# Patient Record
Sex: FEMALE | URBAN - METROPOLITAN AREA
[De-identification: names, ages, dates, MRNs, and addresses within clinical notes are randomized per-mention and may not be internally consistent; named-entity substitution may affect disease eponyms.]

---

## 2022-08-18 ENCOUNTER — OFFICE VISIT (OUTPATIENT)
Dept: ORTHOPEDIC SURGERY | Age: 77
End: 2022-08-18
Payer: COMMERCIAL

## 2022-08-18 DIAGNOSIS — M75.121 COMPLETE TEAR OF RIGHT ROTATOR CUFF, UNSPECIFIED WHETHER TRAUMATIC: ICD-10-CM

## 2022-08-18 DIAGNOSIS — M25.511 RIGHT SHOULDER PAIN, UNSPECIFIED CHRONICITY: Primary | ICD-10-CM

## 2022-08-18 PROCEDURE — 1123F ACP DISCUSS/DSCN MKR DOCD: CPT | Performed by: ORTHOPAEDIC SURGERY

## 2022-08-18 PROCEDURE — 20610 DRAIN/INJ JOINT/BURSA W/O US: CPT | Performed by: ORTHOPAEDIC SURGERY

## 2022-08-18 PROCEDURE — 99203 OFFICE O/P NEW LOW 30 MIN: CPT | Performed by: ORTHOPAEDIC SURGERY

## 2022-08-18 RX ORDER — HYDROXYZINE HYDROCHLORIDE 25 MG/1
25 TABLET, FILM COATED ORAL NIGHTLY
COMMUNITY
Start: 2022-08-05

## 2022-08-18 RX ORDER — AMLODIPINE BESYLATE 2.5 MG/1
TABLET ORAL
COMMUNITY
Start: 2022-08-10

## 2022-08-18 RX ORDER — FLECAINIDE ACETATE 50 MG/1
50 TABLET ORAL EVERY 12 HOURS SCHEDULED
COMMUNITY
Start: 2022-08-05

## 2022-08-18 RX ORDER — FLUTICASONE FUROATE, UMECLIDINIUM BROMIDE AND VILANTEROL TRIFENATATE 200; 62.5; 25 UG/1; UG/1; UG/1
POWDER RESPIRATORY (INHALATION)
COMMUNITY
Start: 2022-08-05

## 2022-08-18 RX ORDER — METOPROLOL SUCCINATE 50 MG/1
50 TABLET, EXTENDED RELEASE ORAL 2 TIMES DAILY
COMMUNITY
Start: 2022-08-05

## 2022-08-18 RX ORDER — TRIAMCINOLONE ACETONIDE 40 MG/ML
40 INJECTION, SUSPENSION INTRA-ARTICULAR; INTRAMUSCULAR ONCE
Status: COMPLETED | OUTPATIENT
Start: 2022-08-18 | End: 2022-08-18

## 2022-08-18 RX ORDER — LIDOCAINE HYDROCHLORIDE 10 MG/ML
1 INJECTION, SOLUTION INFILTRATION; PERINEURAL ONCE
Status: COMPLETED | OUTPATIENT
Start: 2022-08-18 | End: 2022-08-18

## 2022-08-18 RX ORDER — LEVOTHYROXINE SODIUM 0.07 MG/1
75 TABLET ORAL DAILY
COMMUNITY
Start: 2022-08-05

## 2022-08-18 RX ORDER — OXYCODONE AND ACETAMINOPHEN 7.5; 325 MG/1; MG/1
1 TABLET ORAL EVERY 6 HOURS PRN
COMMUNITY

## 2022-08-18 RX ORDER — DOXAZOSIN 2 MG/1
2 TABLET ORAL DAILY
COMMUNITY
Start: 2022-08-05

## 2022-08-18 RX ORDER — PRIMIDONE 50 MG/1
50 TABLET ORAL EVERY 12 HOURS SCHEDULED
COMMUNITY
Start: 2022-08-05

## 2022-08-18 RX ORDER — FLUTICASONE FUROATE, UMECLIDINIUM BROMIDE AND VILANTEROL TRIFENATATE 200; 62.5; 25 UG/1; UG/1; UG/1
1 POWDER RESPIRATORY (INHALATION) DAILY
COMMUNITY
Start: 2022-08-05

## 2022-08-18 RX ORDER — PANTOPRAZOLE SODIUM 40 MG/1
TABLET, DELAYED RELEASE ORAL
COMMUNITY
Start: 2022-08-05

## 2022-08-18 RX ORDER — FLECAINIDE ACETATE 50 MG/1
TABLET ORAL
COMMUNITY
Start: 2022-08-05

## 2022-08-18 RX ORDER — PREGABALIN 25 MG/1
25 CAPSULE ORAL 3 TIMES DAILY
COMMUNITY
Start: 2022-04-17

## 2022-08-18 RX ORDER — CALCIUM POLYCARBOPHIL 625 MG
625 TABLET ORAL DAILY
COMMUNITY

## 2022-08-18 RX ORDER — ATORVASTATIN CALCIUM 40 MG/1
40 TABLET, FILM COATED ORAL NIGHTLY
COMMUNITY
Start: 2022-08-05

## 2022-08-18 RX ORDER — MIRTAZAPINE 15 MG/1
15 TABLET, FILM COATED ORAL NIGHTLY
COMMUNITY
Start: 2022-08-05

## 2022-08-18 RX ORDER — DOXAZOSIN 2 MG/1
TABLET ORAL
COMMUNITY
Start: 2022-08-05

## 2022-08-18 RX ORDER — ATORVASTATIN CALCIUM 40 MG/1
TABLET, FILM COATED ORAL
COMMUNITY
Start: 2022-08-05

## 2022-08-18 RX ORDER — FERROUS SULFATE 325(65) MG
325 TABLET ORAL 2 TIMES DAILY WITH MEALS
COMMUNITY
Start: 2022-08-05

## 2022-08-18 RX ORDER — INCONTINENCE PAD,LINER,DISP
EACH MISCELLANEOUS
COMMUNITY
Start: 2022-07-05

## 2022-08-18 RX ADMIN — LIDOCAINE HYDROCHLORIDE 1 ML: 10 INJECTION, SOLUTION INFILTRATION; PERINEURAL at 13:49

## 2022-08-18 RX ADMIN — TRIAMCINOLONE ACETONIDE 40 MG: 40 INJECTION, SUSPENSION INTRA-ARTICULAR; INTRAMUSCULAR at 13:49

## 2022-08-18 NOTE — PROGRESS NOTES
Chief Complaint    New Patient (Right Shoulder)      History of Present Illness:  Kevin Steiner is a pleasant,  68 y.o. female here today on referral from Dr. Valerio Suresh for consultation and evaluation of right shoulder pain. She has had right shoulder pain for years. Acutely worsened over the past couple months. In April of this year she fell and sustained 3 rib fractures on the right side since then her right shoulder has been hurting her progressively worse. Over the past 6 weeks her right shoulder has become very debilitating and interfering with her activity of daily function. She received a cortisone injection prior to her fall which helped with her pain a little bit and she received another cortisone injection shortly after the fall with some short-term relief without sustained benefit. She has pain at night when she is sleeping. Her range of motion is severely limited. Her pain is exacerbated by moving her shoulder. Medical History:  COPD on 2 L of oxygen at baseline  Atrial fibrillation  Walker dependent  3 7.5 mg percocet daily for chronic pain    No notes on file    Patient's medications, allergies, past medical, surgical, social and family histories were reviewed and updated as appropriate. No past medical history on file.    Social History     Socioeconomic History    Marital status: Not on file     Spouse name: Not on file    Number of children: Not on file    Years of education: Not on file    Highest education level: Not on file   Occupational History    Not on file   Tobacco Use    Smoking status: Not on file    Smokeless tobacco: Not on file   Substance and Sexual Activity    Alcohol use: Not on file    Drug use: Not on file    Sexual activity: Not on file   Other Topics Concern    Not on file   Social History Narrative    Not on file     Social Determinants of Health     Financial Resource Strain: Not on file   Food Insecurity: Not on file   Transportation Needs: Not on file   Physical Activity: Not on file   Stress: Not on file   Social Connections: Not on file   Intimate Partner Violence: Not on file   Housing Stability: Not on file       Allergies   Allergen Reactions    Ibuprofen Hives    Meperidine Hives    Lidocaine      Other reaction(s): Unknown    Meperidine Hcl      Other reaction(s): Unknown    Olanzapine      Other reaction(s): \"shakiness\"    Furosemide Swelling     Other reaction(s): Unknown  Pt states that she cannot take generic form (furosemide)  Face and throat swelling       No current outpatient medications on file prior to visit. No current facility-administered medications on file prior to visit. Review of Systems  A 14 point review of systems was completed by the patient on 8/18/2022 and is available in the media section of the scanned medical record and was reviewed on 8/18/2022. The review is negative with the exception of those things mentioned in the HPI and Past Medical History    Vital Signs:  Vitals:       General Exam:   Constitutional: Patient is adequately groomed with no evidence of malnutrition      R Shoulder Examination:    Inspection:  No skin lesions, no obvious deformity, no swelling. Palpation:  Tenderness all over    Active Range of Motion: Forward Elevation 85, Abduction 85, External Rotation 40, Internal Rotation L5    Passive Range of Motion: same with significant pain and crepitus    Strength:  External Rotation 4/5, Internal Rotation 4/5, Champagne Toast 4/5, Supraspinatus 4/5    Special Tests:  + Zhane's, + Hawkin's, No Art deformity. Neurovascular: Palpable radial pulse, normal sensation in the median, ulnar, radial nerve distributions  Self assessment questionnaires were completed today.       Radiology:     Plain radiographs of the right shoulder, comprising 3 views: AP, Scapular Y and Axillary lateral were  obtained and reviewed in the office:    Impression: Combination of glenohumeral arthritis and a high riding of the humerus likely a combination of glenohumeral arthritis and chronic rotator cuff deficiency. Assessment :  Elton Crowell is a pleasant 68 y.o. female with pain related to R glenohumeral arthritis and chronic rotator cuff deficiency. She has had chronic right shoulder pain but was able to cope with the symptoms and function with activity of daily living despite being walker dependent. However since her recent fall her right shoulder function acutely deteriorated with significant increase in pain and dysfunction. Impression:  Encounter Diagnoses   Name Primary? Right shoulder pain, unspecified chronicity Yes    Complete tear of right rotator cuff, unspecified whether traumatic        Office Procedures:  Orders Placed This Encounter   Procedures    XR SHOULDER RIGHT 1 VW     Standing Status:   Future     Number of Occurrences:   1     Standing Expiration Date:   8/17/2023     Order Specific Question:   Reason for exam:     Answer:   right shoulder pain    20610 - CO DRAIN/INJECT LARGE JOINT/BURSA       Treatment Plan:  Pertinent imaging and exam findings were reviewed with Elton Crowell. The etiology, natural history, and treatment options for the disorder were discussed. The roles of activity modifications, medications, cryotherapy and heat, injections, physical therapy, and surgical interventions were all described to the patient and questions were answered. Procedure: The indications and risks of steroid injection as well as treatment alternatives were discussed with the patient who consented to the procedure. Under sterile conditions and after informed consent was obtained, the patient was given an injection into the right shoulder glenohumeral joint and AC joint. 2mL 40 mg of Kenalog  and 8 mL of 1% lidocaine were placed in the shoulder after it was prepped with chlorhexidine. This resulted in good relief of symptoms. There were no complications.  The patient was advised to ice the shoulder this evening and to avoid vigorous activities for the next 2 days. They were advised to call us if there was any erythema, enduration, swelling or increasing pain. Immediately after the injection with lidocaine, her active FF and abduction improved to over 100 degrees. Elma Soria will follow up in 3 weeks and/or as needed. She is in agreement with this plan and all questions were answered to the patient's satisfaction. She was encouraged to call with any questions. 1:07 PM  8/18/2022    Theresa Houser MD  Sports Medicine Fellow  [address] [place] [address]    The encounter with Elma Soria was supervised by Dr. Dorys Ballesteros who personally examined the patient and reviewed the plan. This dictation was performed with a verbal recognition program (DRAGON) and it was checked for errors. It is possible that there are still dictated errors within this office note. If so, please bring any errors to my attention for an addendum. All efforts were made to ensure that this office note is accurate.   ______________________  I was physically present and personally supervised the Orthopaedic Sports Medicine Fellow in the evaluation and development of a treatment plan for this patient. I personally interviewed the patient and performed a physical examination. In addition, I discussed the patient's condition and treatment options with them. I have also reviewed and agree with the past medical, family and social history unless otherwise noted. All of the patient's questions were answered. Valeria Ballesteros MD, PhD  8/18/2022

## 2022-09-06 ENCOUNTER — OFFICE VISIT (OUTPATIENT)
Dept: ORTHOPEDIC SURGERY | Age: 77
End: 2022-09-06
Payer: COMMERCIAL

## 2022-09-06 DIAGNOSIS — M12.811 ROTATOR CUFF ARTHROPATHY OF RIGHT SHOULDER: ICD-10-CM

## 2022-09-06 DIAGNOSIS — M25.511 RIGHT SHOULDER PAIN, UNSPECIFIED CHRONICITY: Primary | ICD-10-CM

## 2022-09-06 DIAGNOSIS — M75.121 COMPLETE TEAR OF RIGHT ROTATOR CUFF, UNSPECIFIED WHETHER TRAUMATIC: ICD-10-CM

## 2022-09-06 PROCEDURE — 1123F ACP DISCUSS/DSCN MKR DOCD: CPT | Performed by: ORTHOPAEDIC SURGERY

## 2022-09-06 PROCEDURE — 99214 OFFICE O/P EST MOD 30 MIN: CPT | Performed by: ORTHOPAEDIC SURGERY

## 2022-09-06 NOTE — PROGRESS NOTES
[facility]  History and Physical  Shoulder Pain    Date:  2022    Name:  Gerardo Andrade  Address:  [address] [place] [address]    :  1945      Age:   68 y.o.    SSN:  [id number]      Medical Record Number:  [id number]    Reason for Visit:    Follow-up (Right Shoulder)      HPI:   Gerardo Andrade is a 68 y.o. female who presents to our office today for follow up of the right shoulder pain. The patient has well known osteoarthritis of the right glenohumeral joint coupled with a chronic rotator cuff tear. She has responded well to conservative management including corticosteroid injections. The most recent injection was given on 2022 and that gave the patient a tremendous amount of relief. The patient feels that the injection is starting to wear off and her pain levels are gradually increasing. Patient was able to gain more movement with the right shoulder since the injection as well. No new injuries or trauma since the last visit. Gerardo Andrade has a history of COPD, and is on 2L NC. Per her daughter-in-law she also has hemidiaphragmatic paralysis and atrial fibrillation. He ambulates with a special walker. No notes on file    Review of Systems:  A 14 point review of systems available in the scanned medical record as documented by the patient and reviewed on 2022. The review is negative with the exception of those things mentioned in the History of Present Illness and Past Medical History. Past Medical History:  Patient's medications, allergies, past medical, surgical, social and family histories were reviewed and updated as appropriate. Allergies:   Allergies   Allergen Reactions    Ibuprofen Hives    Meperidine Hives    Lidocaine      Other reaction(s): Unknown    Meperidine Hcl      Other reaction(s): Unknown    Olanzapine      Other reaction(s): \"shakiness\"    Furosemide Swelling     Other reaction(s): Unknown  Pt states that she cannot take generic form (furosemide)  Face and throat swelling         Physical Exam:  Vitals:     General: Ron Reddy is a healthy and well appearing 68 y.o. female who is sitting comfortably in our office in acute distress. Skin:  There are no skin lesions, cellulitis, or extreme edema. The patient has warm and well-perfused Bilateral upper extremities with brisk capillary refill. Eyes: Extra-ocular muscles intact  Mouth: Oral mucosa moist. No perioral lesions  Pulm: Respirations unlabored and regular. Neuro: Alert and oriented x3    right Shoulder Exam:  Inspection:  No gross deformities, no signs of infection. Palpation:  There is crepitus. Tenderness to palpation over the rotator cuff footprint and posterior joint line. Non-tender to palpation over the [facility] joint. Active Range of Motion: Forward elevation of 90, abduction of 80, external rotation with elbow at the side 35, internal rotation to the back is L3 versus T11 on the left    Passive Range of Motion: Passively forward elevation can be further increased to 100. Strength: External rotation with resistance with elbow at the side 4/5, internal rotation with resistance with elbow at the side 4/5, Champagne toast testing 4/5, Jobes test 4/5    Special Tests: No Art muscle deformity. Neurovascular: Sensation to light touch is intact, no motor deficits, palpable radial pulses 2+    Additional Examinations:    Examination of the contralateral extremity does not show any tenderness, deformity or injury. Range of motion is unremarkable. There is no gross instability. There are no rashes, ulcerations or lesions. Strength and tone are normal.      Radiographic:  X ray not obtained. Assessment:  Ron Reddy is a 68 y.o. female with a history of COPD, A. fib and paralysis of Fady diaphragm currently with severe end-stage rotator cuff arthropathy. Impression:  Encounter Diagnoses   Name Primary?     Right shoulder pain, unspecified chronicity Yes    Complete tear of right rotator cuff, unspecified whether traumatic     Rotator cuff arthropathy of right shoulder        Office Procedures:  No orders of the defined types were placed in this encounter. Plan: We had a candid discussion with Tavo Chapa regarding the next steps moving forward. She really does not have a lot of options for managing the symptoms related to her right shoulder rotator cuff arthropathy. The patient may consider receiving additional corticosteroid injections however this is limited to one every 3 or 4 months. Over time, the effects of these injections will continue to diminish. She is only getting a few weeks of relief from her most recent injection, so this does not appear to be a long term solution. She could also try pain management but opioids and other analgesics may not be the best for her as they may worsen respiratory depression and her overall balance. We do feel that definitive treatment is a reverse total shoulder arthroplasty. She would need medical clearance for this procedure to be done. She is encouraged to talk to her family doctor, pulmonologist and cardiologist regarding this. When she has met with them we will see her back to discuss surgery in full detail. Of note, we will arrange for an anesthesia consult prior to potential surgery. She may not be a candidate for an interscalene block given her hemidiaphragm dysfunction. Tavo Chapa will follow up in 4 weeks and/or as needed. They were in agreement with this plan and all questions were answered to the patient's satisfaction. They were encouraged to call with any questions. Greater than 30 minutes were expended on all aspects of today's visit. 9/6/2022  11:48 AM      Collin Brennan PA-C  Orthopaedic Sports Medicine Physician Assistant    During this examination, ICollin PA-C, functioned as a scribe for Dr. Thiago Guillen.      This dictation was performed with a verbal recognition program (DRAGON) and it was checked for errors. It is possible that there are still dictated errors within this office note. If so, please bring any errors to my attention for an addendum. All efforts were made to ensure that this office note is accurate.  ____________  I, Dr. Ulysses Raymond, personally performed the services described in this documentation as described by Cayedn Coleman PA-C in my presence, and it is both accurate and complete. Valeria Lord MD, PhD  9/6/2022

## 2022-11-16 ENCOUNTER — TELEPHONE (OUTPATIENT)
Dept: ORTHOPEDIC SURGERY | Age: 77
End: 2022-11-16

## 2022-11-16 NOTE — TELEPHONE ENCOUNTER
Surgery and/or Procedure Scheduling     Contact Name: Opal Cool Request: [facility] RIGHT  Patient Contact Number: [phone]

## 2022-11-18 NOTE — TELEPHONE ENCOUNTER
ON SCHEDULE 12/13, HER LAST CLEARANCE APPT WILL BE 12/5.  SHE WILL NEED TO STAY AND BE SET FOR FACILITY

## 2022-11-25 ENCOUNTER — TELEPHONE (OUTPATIENT)
Dept: ORTHOPEDIC SURGERY | Age: 77
End: 2022-11-25

## 2022-11-29 NOTE — TELEPHONE ENCOUNTER
Darin Plan [id number]    Date: 12/13/22 thru 03/12/23  Type of SX:  Outpatient  Location: [facility]  CPT: 82384   DX Code: I65.760  SX area: Rt shoulder  Insurance: [employer]

## 2022-12-02 ENCOUNTER — TELEPHONE (OUTPATIENT)
Dept: ORTHOPEDIC SURGERY | Age: 77
End: 2022-12-02

## 2022-12-02 NOTE — TELEPHONE ENCOUNTER
Orthopedic Nurse Navigator Summary    COVID:  Vaccinated and booster    Patient Name:  Oscar Spears  Anticipated Date of Surgery:  12/13/22  Attended Pre-op Education Class:  Video sent to her daughter in laws email- they will watch together  PCP: Jayson Parmar DO  Date of PCP visit for H&P: 12/05/22  Is patient in a Pain Management program: Yes- she sees Dr. Thornton Doing- taking Oxycodone 7.5 mg TID, and Lyrica 25mg TID  Review of Medical history reveals history of: Chronic respiratory failure- thought to be due to poor CPAP compliance, COPD, JENNIE, PAF, CHF, HTN, Hyperlipidemia, Secondary parkinsonism, Hypothyroid, GERD, Essential tremor, Gout, Fibromyalgia, History of Rt breast Cancer with mastectomy and sentinel lymph node biopsy    Critical Lab Values  - Hemoglobin (g/dL):  Date: 12/05/22 Value 13.3  - Hematocrit(%): Date: 12/05/22  Value 43.2  - HgbA1C:  Date: 12/05/22 Value 5.1  - Albumin:  Date: 12/05/22  Value 4.2  - BUN:  Date: 12/05/22  Value 20  - Creatinine:  Date: 12/05/22  Value 0.84    MRSA swab 12/05/22-negative    Coronary Artery Disease/HTN/CHF history: Yes  Cardiologist: Catalino Julian MD  Cardiac clearance necessary:  Yes  Date of cardiac clearance appt: 11/28/22  Final Cardiac recommendations: On any anticoagulation:    Diabetes History:  No  Most recent HgbA1C:  Pulmonary:  COPD/Emphysema/Use of home oxygen: COPD- uses 2.5L of Oxygen at all times  Alcohol use: No    BMI greater than 40 at time of scheduling: Additional medical concerns: Additional recommendations for above concerns:  Attended Pre-Hab program:    Anticipated Discharge Disposition:  Rehab unit.   Patient states Dr. Lo Puri has plans to send her to the rehab unit with her multiple health issues and she lives alone and does not have full time help from family members  Who will be with patient at home following discharge:  She lives alone  Equipment patient already has:  No sling- she does have an appointment on 12/06/22 with  Juanirrie Sprain on first or second floor:  first  Bathroom on first or second floor:  first  Weight bearing status:  neb rt arm  Pre-op ambulatory status:   Number of entry steps:  Her apartment building has a ramp for entrance   Caregiver assistance:  part time- her daughter in law will be available to drive her to doctor appointments and outpatient therapy    Dionisio Us RN  Date: 12/02/22

## 2022-12-06 ENCOUNTER — OFFICE VISIT (OUTPATIENT)
Dept: ORTHOPEDIC SURGERY | Age: 77
End: 2022-12-06

## 2022-12-06 VITALS — HEIGHT: 65 IN | BODY MASS INDEX: 30.32 KG/M2 | WEIGHT: 182 LBS

## 2022-12-06 DIAGNOSIS — M12.811 ROTATOR CUFF ARTHROPATHY OF RIGHT SHOULDER: ICD-10-CM

## 2022-12-06 DIAGNOSIS — M75.121 COMPLETE TEAR OF RIGHT ROTATOR CUFF, UNSPECIFIED WHETHER TRAUMATIC: ICD-10-CM

## 2022-12-06 DIAGNOSIS — M25.511 RIGHT SHOULDER PAIN, UNSPECIFIED CHRONICITY: Primary | ICD-10-CM

## 2022-12-06 PROBLEM — I50.43 ACUTE ON CHRONIC COMBINED SYSTOLIC AND DIASTOLIC CONGESTIVE HEART FAILURE (HCC): Status: ACTIVE | Noted: 2017-04-11

## 2022-12-06 PROBLEM — M81.0 AGE-RELATED OSTEOPOROSIS WITHOUT CURRENT PATHOLOGICAL FRACTURE: Status: ACTIVE | Noted: 2022-07-11

## 2022-12-06 PROBLEM — J96.11 CHRONIC RESPIRATORY FAILURE WITH HYPOXIA AND HYPERCAPNIA (HCC): Status: ACTIVE | Noted: 2021-11-30

## 2022-12-06 PROBLEM — J96.12 CHRONIC RESPIRATORY FAILURE WITH HYPOXIA AND HYPERCAPNIA (HCC): Status: ACTIVE | Noted: 2021-11-30

## 2022-12-06 PROBLEM — J44.9 COPD (CHRONIC OBSTRUCTIVE PULMONARY DISEASE) (HCC): Status: ACTIVE | Noted: 2021-04-09

## 2022-12-06 NOTE — PROGRESS NOTES
[facility]  History and Physical  Shoulder Pain    Date:  2022    Name:  Claire Morris  Address:  [address] [place] [address]    :  1945      Age:   68 y.o.    SSN:  [id number]      Medical Record Number:  [id number]    Reason for Visit:    Shoulder Pain (PRE OP RIGHT SHOULDER)      HPI:   Claire Morris is a 68 y.o. female who presents to our office today for follow up of the right shoulder pain. This patient has severe end-stage glenohumeral osteoarthritis. Patient has extremely poor function as well as severe discomfort that has not responded well to conservative treatment. Patient did have a corticosteroid injection in the past which was not effective for more than 2 weeks at a time. The patient reports she was able to meet with her cardiologist, pulmonologist and her medical doctor and was able to finally be cleared for surgical intervention. We had discussed doing a reverse shoulder replacement in the past.  Patient presents today with her daughter to discuss this further. She continues to use a walker to ambulate as she is a fall risk. She has pain with movement overhead or behind her back. She reports it does wake her up at nighttime several times. Pain Assessment  Location of Pain: Shoulder  Location Modifiers: Right  Severity of Pain: 7  Quality of Pain: Throbbing, Sharp, Dull, Aching  Duration of Pain: Persistent  Frequency of Pain: Constant  Aggravating Factors: Other (Comment), Straightening, Stretching  Limiting Behavior: Yes  Relieving Factors: Rest  Work-Related Injury: No  Are there other pain locations you wish to document?: No    No notes on file    Review of Systems:  A 14 point review of systems available in the scanned medical record as documented by the patient and reviewed on 2022.   The review is negative with the exception of those things mentioned in the History of Present Illness and Past Medical History. Past Medical History:  Patient's medications, allergies, past medical, surgical, social and family histories were reviewed and updated as appropriate. Allergies: Allergies   Allergen Reactions    Ibuprofen Hives    Meperidine Hives    Meperidine Hcl      Other reaction(s): Unknown    Olanzapine      Other reaction(s): \"shakiness\"    Furosemide Swelling     Other reaction(s): Unknown  Pt states that she cannot take generic form (furosemide)  Face and throat swelling      Lidocaine Rash     Other reaction(s): Unknown  Other reaction(s): Unknown       Physical Exam:  There were no vitals filed for this visit. General: Chris Martinez is a healthy and well appearing 68 y.o. female who is sitting comfortably in our office in acute distress. Skin:  There are no skin lesions, cellulitis, or extreme edema. The patient has warm and well-perfused Bilateral upper extremities with brisk capillary refill. Eyes: Extra-ocular muscles intact  Mouth: Oral mucosa moist. No perioral lesions  Pulm: Respirations unlabored and regular. Neuro: Alert and oriented x3    Right Shoulder Exam:  Inspection:  No gross deformities, no signs of infection. Palpation:  There is crepitus. Tenderness to palpation over the rotator cuff footprint, posterior joint line and bicipital groove. Non-tender to palpation over the kac joint. Active Range of Motion: Forward elevation of 80 with scapulothoracic compensation, abduction of 70, external rotation with elbow at the side 20, internal rotation to the back is L4    Passive Range of Motion: Passively forward elevation can be further increased to 110 pain. Strength: External rotation with resistance with elbow at the side -4/5, internal rotation with resistance with elbow at the side 4/5, Champagne toast testing -4/5, Jobes test -4/5    Special Tests: No external rotation lag, positive Zhane, no Art muscle deformity.     Neurovascular: Sensation to light touch is intact, no motor deficits, palpable radial pulses 2+    Additional Examinations:    Examination of the contralateral extremity does not show any tenderness, deformity or injury. Range of motion is unremarkable. There is no gross instability. There are no rashes, ulcerations or lesions. Strength and tone are normal.      Radiographic:  X-rays from August 18 th, 2022 showed severe glenohumeral degenerative changes. Assessment:  Manuela Castellanos is a 68 y.o. female with right shoulder pain and limited function related to severe and advancing glenohumeral osteoarthritis a chronic rotator cuff tear. Impression:  Encounter Diagnoses   Name Primary? Right shoulder pain, unspecified chronicity Yes    Complete tear of right rotator cuff, unspecified whether traumatic     Rotator cuff arthropathy of right shoulder        Office Procedures:  Orders Placed This Encounter   Procedures    MRI SHOULDER RIGHT WO CONTRAST     Standing Status:   Future     Standing Expiration Date:   12/6/2023     Order Specific Question:   Reason for exam:     Answer:   Right shoulder     Order Specific Question:   What is the sedation requirement? Answer:   None    CT SHOULDER RIGHT WO CONTRAST     Standing Status:   Future     Standing Expiration Date:   12/6/2023     Order Specific Question:   Reason for exam:     Answer:   DJO matchpoint     Order Specific Question:   Reason for exam:     Answer:   Darya Clark    DJO ultrasling IV Shoulder Sling     Patient was prescribed a DJO Ultrasling IV Shoulder Brace. The right shoulder will require stabilization / immobilization from this orthosis. The orthosis will assist in protecting the affected area, provide functional support and facilitate healing. The device was ordered and fit on 12/6/22. The patient was educated and fit by a healthcare professional with expert knowledge and specialization in brace application while under the direct supervision of the treating physician.   Verbal and written instructions for the use of and application of this item were provided. They were instructed to contact the office immediately should the brace result in increased pain, decreased sensation, increased swelling or worsening of the condition. Plan: We had a lengthy discussion with the patient today. Patient has failed conservative treatment and we feel that her next best option is a reverse total shoulder arthroplasty for the right shoulder. This would help with restoring both function and reducing pain levels. We will require some testing prior to her surgery for planning purposes. We do recommend getting a CT scan of her right shoulder. This will need to be completed prior to her surgery which we are hoping to plan for next week. Matt Ying will follow up postoperative and/or as needed. They were in agreement with this plan and all questions were answered to the patient's satisfaction. They were encouraged to call with any questions. 12/6/2022  2:50 PM      Thuan Guerra PA-C  Orthopaedic Sports Medicine Physician Assistant      This dictation was performed with a verbal recognition program [facility]) and it was checked for errors. It is possible that there are still dictated errors within this office note. If so, please bring any errors to my attention for an addendum. All efforts were made to ensure that this office note is accurate.

## 2022-12-12 NOTE — PROGRESS NOTES
Place patient label inside box (if no patient label, complete below)  Name:  :  MR#:   Tamar Marcano / [address]  I (we), Nate Gaines (Patient Name) authorize Eliana Venegas MD (Provider / Shelby Thayer) and/or such assistants as may be selected by him/her, to perform the following operation/procedure(s): RIGHT REVERSE TOTAL SHOULDER REPLACEMENT        Note: If unable to obtain consent prior to an emergent procedure, document the emergent reason in the medical record. This procedure has been explained to my (our) satisfaction and included in the explanation was: The intended benefit, nature, and extent of the procedure to be performed; The significant risks involved and the probability of success; Alternative procedures and methods of treatment; The dangers and probable consequences of such alternatives (including no procedure or treatment); The expected consequences of the procedure on my future health; Whether other qualified individuals would be performing important surgical tasks and/or whether  would be present to advise or support the procedure. I (we) understand that there are other risks of infection and other serious complications in the pre-operative/procedural and postoperative/procedural stages of my (our) care. I (we) have asked all of the questions which I (we) thought were important in deciding whether or not to undergo treatment or diagnosis. These questions have been answered to my (our) satisfaction. I (we) understand that no assurance can be given that the procedure will be a success, and no guarantee or warranty of success has been given to me (us). It has been explained to me (us) that during the course of the operation/procedure, unforeseen conditions may be revealed that necessitate extension of the original procedure(s) or different procedure(s) than those set forth in Paragraph 1.  I (we) authorize and request that the ______________________________________________________________________________________________    If a phone consent is obtained, consent will be documented by using two health care professionals, each affirming that the consenting party has no questions and gives consent for the procedure discussed with the physician/provider.   _____________________          ____________________       _____/_____am/pm   2nd witness to phone consent        Printed name           Date / Time    Informed Consent:  I have provided the explanation described above in section 1 to the patient and/or legal representative.  I have provided the patient and/or legal representative with an opportunity to ask any questions about the proposed operation/procedure.   ___________________________          ____________________         ____/____am/pm  Provider / Proceduralist                            Printed name            Date / Time  Revised 8/2/2021                                                                      Page 2 of 2

## 2022-12-12 NOTE — PROGRESS NOTES

## 2022-12-12 NOTE — PROGRESS NOTES
Called cardiologist and requested EKG tracing done 11-28 to be faxed for surgery tomorrow. /MA    12-12-22 @ 1153 - Positive urine culture resulted 12-9, results faxed to surgeon, message sent to [facility] also.  Zaira Kay

## 2022-12-12 NOTE — PROGRESS NOTES
Place patient label inside box (if no patient label, complete below)  Name:  :  MR#:   Nasima Arreaga / PROCEDURE  I (we), Alfredito Bush (Patient Name) authorize DR. Zo Wolf (Provider / Ale Coleman) and/or such assistants as may be selected by him/her, to perform the following operation/procedure(s): RIGHT REVERSE TOTAL SHOULDER REPLACEMENT       Note: If unable to obtain consent prior to an emergent procedure, document the emergent reason in the medical record. This procedure has been explained to my (our) satisfaction and included in the explanation was: The intended benefit, nature, and extent of the procedure to be performed; The significant risks involved and the probability of success; Alternative procedures and methods of treatment; The dangers and probable consequences of such alternatives (including no procedure or treatment); The expected consequences of the procedure on my future health; Whether other qualified individuals would be performing important surgical tasks and/or whether  would be present to advise or support the procedure. I (we) understand that there are other risks of infection and other serious complications in the pre-operative/procedural and postoperative/procedural stages of my (our) care. I (we) have asked all of the questions which I (we) thought were important in deciding whether or not to undergo treatment or diagnosis. These questions have been answered to my (our) satisfaction. I (we) understand that no assurance can be given that the procedure will be a success, and no guarantee or warranty of success has been given to me (us). It has been explained to me (us) that during the course of the operation/procedure, unforeseen conditions may be revealed that necessitate extension of the original procedure(s) or different procedure(s) than those set forth in Paragraph 1.  I (we) authorize and request that the above-named physician, his/her assistants or his/her designees, perform procedures as necessary and desirable if deemed to be in my (our) best interest.     Revised 8/2/2021                                                                          Page 1 of 2       I acknowledge that health care personnel may be observing this procedure for the purpose of medical education or other specified purposes as may be necessary as requested and/or approved by my (our) physician. I (we) consent to the disposal by the hospital Pathologist of the removed tissue, parts or organs in accordance with hospital policy. I do ____ do not ____ consent to the use of a local infiltration pain blocking agent that will be used by my provider/surgical provider to help alleviate pain during my procedure. I do ____ do not ____ consent to an emergent blood transfusion in the case of a life-threatening situation that requires blood components to be administered. This consent is valid for 24 hours from the beginning of the procedure. This patient does ____ or does not ____ currently have a DNR status/order. If DNR order is in place, obtain Addendum to the Surgical Consent for ALL Patients with a DNR Order to address janet-operative status for limited intervention or DNR suspension.      I have read and fully understand the above Consent for Operation/Procedure and that all blanks were completed before I signed the consent.   _____________________________       _____________________      ____/____am/pm  Signature of Patient or legal representative      Printed Name / Relationship            Date / Time   ____________________________       _____________________      ____/____am/pm  Witness to Signature                                    Printed Name                    Date / Time    If patient is unable to sign or is a minor, complete the following)  Patient is a minor, ____ years of age, or unable to sign because: ______________________________________________________________________________________________    If a phone consent is obtained, consent will be documented by using two health care professionals, each affirming that the consenting party has no questions and gives consent for the procedure discussed with the physician/provider.   _____________________          ____________________       _____/_____am/pm   2nd witness to phone consent        Printed name           Date / Time    Informed Consent:  I have provided the explanation described above in section 1 to the patient and/or legal representative.  I have provided the patient and/or legal representative with an opportunity to ask any questions about the proposed operation/procedure.   ___________________________          ____________________         ____/____am/pm  Provider / Proceduralist                            Printed name            Date / Time  Revised 8/2/2021                                                                      Page 2 of 2

## 2022-12-13 ENCOUNTER — ANESTHESIA (OUTPATIENT)
Dept: OPERATING ROOM | Age: 77
End: 2022-12-13
Payer: MEDICARE

## 2022-12-13 ENCOUNTER — ANESTHESIA EVENT (OUTPATIENT)
Dept: OPERATING ROOM | Age: 77
End: 2022-12-13
Payer: MEDICARE

## 2022-12-13 ENCOUNTER — HOSPITAL ENCOUNTER (INPATIENT)
Age: 77
LOS: 6 days | Discharge: NURSING, REHAB OR LONG-TERM CARE | DRG: 483 | End: 2022-12-19
Attending: ORTHOPAEDIC SURGERY | Admitting: ORTHOPAEDIC SURGERY
Payer: MEDICARE

## 2022-12-13 ENCOUNTER — APPOINTMENT (OUTPATIENT)
Dept: GENERAL RADIOLOGY | Age: 77
DRG: 483 | End: 2022-12-13
Attending: ORTHOPAEDIC SURGERY
Payer: MEDICARE

## 2022-12-13 DIAGNOSIS — M12.811 ROTATOR CUFF ARTHROPATHY OF RIGHT SHOULDER: Primary | ICD-10-CM

## 2022-12-13 PROBLEM — Z96.611 S/P REVERSE TOTAL SHOULDER ARTHROPLASTY, RIGHT: Status: ACTIVE | Noted: 2022-12-13

## 2022-12-13 LAB
ABO/RH: NORMAL
ANTIBODY SCREEN: NORMAL

## 2022-12-13 PROCEDURE — 6360000002 HC RX W HCPCS: Performed by: ORTHOPAEDIC SURGERY

## 2022-12-13 PROCEDURE — 2500000003 HC RX 250 WO HCPCS: Performed by: ORTHOPAEDIC SURGERY

## 2022-12-13 PROCEDURE — 3600000014 HC SURGERY LEVEL 4 ADDTL 15MIN: Performed by: ORTHOPAEDIC SURGERY

## 2022-12-13 PROCEDURE — C1776 JOINT DEVICE (IMPLANTABLE): HCPCS | Performed by: ORTHOPAEDIC SURGERY

## 2022-12-13 PROCEDURE — 7100000001 HC PACU RECOVERY - ADDTL 15 MIN: Performed by: ORTHOPAEDIC SURGERY

## 2022-12-13 PROCEDURE — 1200000000 HC SEMI PRIVATE

## 2022-12-13 PROCEDURE — 6360000002 HC RX W HCPCS: Performed by: ANESTHESIOLOGY

## 2022-12-13 PROCEDURE — 2500000003 HC RX 250 WO HCPCS: Performed by: NURSE ANESTHETIST, CERTIFIED REGISTERED

## 2022-12-13 PROCEDURE — 86900 BLOOD TYPING SEROLOGIC ABO: CPT

## 2022-12-13 PROCEDURE — 2580000003 HC RX 258: Performed by: ANESTHESIOLOGY

## 2022-12-13 PROCEDURE — 0RRJ00Z REPLACEMENT OF RIGHT SHOULDER JOINT WITH REVERSE BALL AND SOCKET SYNTHETIC SUBSTITUTE, OPEN APPROACH: ICD-10-PCS | Performed by: ORTHOPAEDIC SURGERY

## 2022-12-13 PROCEDURE — 86901 BLOOD TYPING SEROLOGIC RH(D): CPT

## 2022-12-13 PROCEDURE — 86850 RBC ANTIBODY SCREEN: CPT

## 2022-12-13 PROCEDURE — 94660 CPAP INITIATION&MGMT: CPT

## 2022-12-13 PROCEDURE — 2580000003 HC RX 258

## 2022-12-13 PROCEDURE — 3700000001 HC ADD 15 MINUTES (ANESTHESIA): Performed by: ORTHOPAEDIC SURGERY

## 2022-12-13 PROCEDURE — 94150 VITAL CAPACITY TEST: CPT

## 2022-12-13 PROCEDURE — 94640 AIRWAY INHALATION TREATMENT: CPT

## 2022-12-13 PROCEDURE — 94799 UNLISTED PULMONARY SVC/PX: CPT

## 2022-12-13 PROCEDURE — 2709999900 HC NON-CHARGEABLE SUPPLY: Performed by: ORTHOPAEDIC SURGERY

## 2022-12-13 PROCEDURE — 6370000000 HC RX 637 (ALT 250 FOR IP): Performed by: INTERNAL MEDICINE

## 2022-12-13 PROCEDURE — 0LS30ZZ REPOSITION RIGHT UPPER ARM TENDON, OPEN APPROACH: ICD-10-PCS | Performed by: ORTHOPAEDIC SURGERY

## 2022-12-13 PROCEDURE — C9290 INJ, BUPIVACAINE LIPOSOME: HCPCS | Performed by: ORTHOPAEDIC SURGERY

## 2022-12-13 PROCEDURE — 6370000000 HC RX 637 (ALT 250 FOR IP)

## 2022-12-13 PROCEDURE — 3700000000 HC ANESTHESIA ATTENDED CARE: Performed by: ORTHOPAEDIC SURGERY

## 2022-12-13 PROCEDURE — 7100000000 HC PACU RECOVERY - FIRST 15 MIN: Performed by: ORTHOPAEDIC SURGERY

## 2022-12-13 PROCEDURE — 94761 N-INVAS EAR/PLS OXIMETRY MLT: CPT

## 2022-12-13 PROCEDURE — 2580000003 HC RX 258: Performed by: ORTHOPAEDIC SURGERY

## 2022-12-13 PROCEDURE — A4217 STERILE WATER/SALINE, 500 ML: HCPCS | Performed by: ORTHOPAEDIC SURGERY

## 2022-12-13 PROCEDURE — 73020 X-RAY EXAM OF SHOULDER: CPT

## 2022-12-13 PROCEDURE — 2700000000 HC OXYGEN THERAPY PER DAY

## 2022-12-13 PROCEDURE — 6370000000 HC RX 637 (ALT 250 FOR IP): Performed by: ORTHOPAEDIC SURGERY

## 2022-12-13 PROCEDURE — 6360000002 HC RX W HCPCS: Performed by: NURSE ANESTHETIST, CERTIFIED REGISTERED

## 2022-12-13 PROCEDURE — 6370000000 HC RX 637 (ALT 250 FOR IP): Performed by: ANESTHESIOLOGY

## 2022-12-13 PROCEDURE — 3600000004 HC SURGERY LEVEL 4 BASE: Performed by: ORTHOPAEDIC SURGERY

## 2022-12-13 DEVICE — IMPL CAPPED SHOULDER S3 TOTAL ADV REVERSE DJO: Type: IMPLANTABLE DEVICE | Site: SHOULDER | Status: FUNCTIONAL

## 2022-12-13 DEVICE — RSP BASEPLATE, 30MM, W/P2 COATING
Type: IMPLANTABLE DEVICE | Site: SHOULDER | Status: FUNCTIONAL
Brand: DJO SURGICAL

## 2022-12-13 DEVICE — SCREW, LOCKING BONE, RSP, 5X22
Type: IMPLANTABLE DEVICE | Site: SHOULDER | Status: FUNCTIONAL
Brand: DJO SURGICAL

## 2022-12-13 DEVICE — AR, SMALL SOCKET INSERT, 32MM NEUTRAL EPLUS
Type: IMPLANTABLE DEVICE | Site: SHOULDER | Status: FUNCTIONAL
Brand: DJO SURGICAL

## 2022-12-13 DEVICE — SCREW, LOCKING BONE, RSP, 5X18
Type: IMPLANTABLE DEVICE | Site: SHOULDER | Status: FUNCTIONAL
Brand: DJO SURGICAL

## 2022-12-13 DEVICE — GLENOID, HEAD W/RETAINING SCREW, RSP, 32MM/-4MM
Type: IMPLANTABLE DEVICE | Site: SHOULDER | Status: FUNCTIONAL
Brand: DJO SURGICAL

## 2022-12-13 DEVICE — SCREW, LOCKING BONE, RSP, 5X30
Type: IMPLANTABLE DEVICE | Site: SHOULDER | Status: FUNCTIONAL
Brand: DJO SURGICAL

## 2022-12-13 DEVICE — IMPLANTABLE DEVICE: Type: IMPLANTABLE DEVICE | Site: SHOULDER | Status: FUNCTIONAL

## 2022-12-13 RX ORDER — ALBUTEROL SULFATE 2.5 MG/3ML
2.5 SOLUTION RESPIRATORY (INHALATION) EVERY 4 HOURS PRN
Status: DISCONTINUED | OUTPATIENT
Start: 2022-12-13 | End: 2022-12-16

## 2022-12-13 RX ORDER — DOXAZOSIN 2 MG/1
2 TABLET ORAL DAILY
Status: DISCONTINUED | OUTPATIENT
Start: 2022-12-14 | End: 2022-12-20 | Stop reason: HOSPADM

## 2022-12-13 RX ORDER — SODIUM CHLORIDE 0.9 % (FLUSH) 0.9 %
5-40 SYRINGE (ML) INJECTION PRN
Status: DISCONTINUED | OUTPATIENT
Start: 2022-12-13 | End: 2022-12-13 | Stop reason: HOSPADM

## 2022-12-13 RX ORDER — LEVOTHYROXINE SODIUM 0.07 MG/1
75 TABLET ORAL DAILY
Status: DISCONTINUED | OUTPATIENT
Start: 2022-12-14 | End: 2022-12-20 | Stop reason: HOSPADM

## 2022-12-13 RX ORDER — CETIRIZINE HYDROCHLORIDE 10 MG/1
10 TABLET ORAL DAILY
Status: DISCONTINUED | OUTPATIENT
Start: 2022-12-13 | End: 2022-12-20 | Stop reason: HOSPADM

## 2022-12-13 RX ORDER — SODIUM CHLORIDE 0.9 % (FLUSH) 0.9 %
5-40 SYRINGE (ML) INJECTION EVERY 12 HOURS SCHEDULED
Status: DISCONTINUED | OUTPATIENT
Start: 2022-12-13 | End: 2022-12-13 | Stop reason: HOSPADM

## 2022-12-13 RX ORDER — PROPOFOL 10 MG/ML
INJECTION, EMULSION INTRAVENOUS PRN
Status: DISCONTINUED | OUTPATIENT
Start: 2022-12-13 | End: 2022-12-13 | Stop reason: SDUPTHER

## 2022-12-13 RX ORDER — SODIUM CHLORIDE 9 MG/ML
25 INJECTION, SOLUTION INTRAVENOUS PRN
Status: DISCONTINUED | OUTPATIENT
Start: 2022-12-13 | End: 2022-12-13 | Stop reason: HOSPADM

## 2022-12-13 RX ORDER — PHENYLEPHRINE HYDROCHLORIDE 10 MG/ML
INJECTION INTRAVENOUS PRN
Status: DISCONTINUED | OUTPATIENT
Start: 2022-12-13 | End: 2022-12-13 | Stop reason: SDUPTHER

## 2022-12-13 RX ORDER — DOXYCYCLINE 50 MG/1
50 CAPSULE ORAL EVERY 12 HOURS SCHEDULED
Status: DISCONTINUED | OUTPATIENT
Start: 2022-12-13 | End: 2022-12-20 | Stop reason: HOSPADM

## 2022-12-13 RX ORDER — DOXYCYCLINE HYCLATE 100 MG
100 TABLET ORAL 2 TIMES DAILY
Qty: 10 TABLET | Refills: 0 | Status: SHIPPED | OUTPATIENT
Start: 2022-12-13 | End: 2022-12-18

## 2022-12-13 RX ORDER — OXYCODONE HYDROCHLORIDE AND ACETAMINOPHEN 5; 325 MG/1; MG/1
1 TABLET ORAL EVERY 6 HOURS PRN
Qty: 28 TABLET | Refills: 0 | Status: CANCELLED | OUTPATIENT
Start: 2022-12-13 | End: 2022-12-20

## 2022-12-13 RX ORDER — ESMOLOL HYDROCHLORIDE 10 MG/ML
INJECTION INTRAVENOUS PRN
Status: DISCONTINUED | OUTPATIENT
Start: 2022-12-13 | End: 2022-12-13 | Stop reason: SDUPTHER

## 2022-12-13 RX ORDER — SODIUM CHLORIDE, SODIUM LACTATE, POTASSIUM CHLORIDE, CALCIUM CHLORIDE 600; 310; 30; 20 MG/100ML; MG/100ML; MG/100ML; MG/100ML
INJECTION, SOLUTION INTRAVENOUS CONTINUOUS
Status: DISCONTINUED | OUTPATIENT
Start: 2022-12-13 | End: 2022-12-13 | Stop reason: HOSPADM

## 2022-12-13 RX ORDER — MIRTAZAPINE 15 MG/1
15 TABLET, FILM COATED ORAL NIGHTLY
Status: DISCONTINUED | OUTPATIENT
Start: 2022-12-13 | End: 2022-12-20 | Stop reason: HOSPADM

## 2022-12-13 RX ORDER — ROCURONIUM BROMIDE 10 MG/ML
INJECTION, SOLUTION INTRAVENOUS PRN
Status: DISCONTINUED | OUTPATIENT
Start: 2022-12-13 | End: 2022-12-13 | Stop reason: SDUPTHER

## 2022-12-13 RX ORDER — ACETAMINOPHEN 500 MG
1000 TABLET ORAL ONCE
Status: COMPLETED | OUTPATIENT
Start: 2022-12-13 | End: 2022-12-13

## 2022-12-13 RX ORDER — HYDROXYZINE HYDROCHLORIDE 25 MG/1
25 TABLET, FILM COATED ORAL NIGHTLY
Status: DISCONTINUED | OUTPATIENT
Start: 2022-12-13 | End: 2022-12-20 | Stop reason: HOSPADM

## 2022-12-13 RX ORDER — ATORVASTATIN CALCIUM 40 MG/1
40 TABLET, FILM COATED ORAL NIGHTLY
Status: DISCONTINUED | OUTPATIENT
Start: 2022-12-13 | End: 2022-12-20 | Stop reason: HOSPADM

## 2022-12-13 RX ORDER — ONDANSETRON 2 MG/ML
INJECTION INTRAMUSCULAR; INTRAVENOUS PRN
Status: DISCONTINUED | OUTPATIENT
Start: 2022-12-13 | End: 2022-12-13 | Stop reason: SDUPTHER

## 2022-12-13 RX ORDER — ALBUTEROL SULFATE 2.5 MG/3ML
2.5 SOLUTION RESPIRATORY (INHALATION) EVERY 6 HOURS PRN
Status: DISCONTINUED | OUTPATIENT
Start: 2022-12-13 | End: 2022-12-20 | Stop reason: HOSPADM

## 2022-12-13 RX ORDER — ASPIRIN 81 MG/1
81 TABLET ORAL DAILY
Status: ON HOLD | COMMUNITY
End: 2022-12-13 | Stop reason: SDUPTHER

## 2022-12-13 RX ORDER — HYDRALAZINE HYDROCHLORIDE 20 MG/ML
10 INJECTION INTRAMUSCULAR; INTRAVENOUS
Status: DISCONTINUED | OUTPATIENT
Start: 2022-12-13 | End: 2022-12-13 | Stop reason: HOSPADM

## 2022-12-13 RX ORDER — SENNA PLUS 8.6 MG/1
1 TABLET ORAL 2 TIMES DAILY PRN
Qty: 20 TABLET | Refills: 0 | Status: SHIPPED | OUTPATIENT
Start: 2022-12-13 | End: 2022-12-27

## 2022-12-13 RX ORDER — ACETAMINOPHEN 325 MG/1
650 TABLET ORAL EVERY 6 HOURS
Status: DISCONTINUED | OUTPATIENT
Start: 2022-12-13 | End: 2022-12-20 | Stop reason: HOSPADM

## 2022-12-13 RX ORDER — LIDOCAINE HYDROCHLORIDE 10 MG/ML
1 INJECTION, SOLUTION EPIDURAL; INFILTRATION; INTRACAUDAL; PERINEURAL
Status: DISCONTINUED | OUTPATIENT
Start: 2022-12-13 | End: 2022-12-13 | Stop reason: HOSPADM

## 2022-12-13 RX ORDER — METOPROLOL SUCCINATE 50 MG/1
50 TABLET, EXTENDED RELEASE ORAL 2 TIMES DAILY
Status: DISCONTINUED | OUTPATIENT
Start: 2022-12-13 | End: 2022-12-20 | Stop reason: HOSPADM

## 2022-12-13 RX ORDER — DOCUSATE SODIUM 100 MG/1
100 CAPSULE, LIQUID FILLED ORAL 2 TIMES DAILY
COMMUNITY

## 2022-12-13 RX ORDER — OXYCODONE HYDROCHLORIDE 5 MG/1
5 TABLET ORAL EVERY 4 HOURS PRN
Status: DISCONTINUED | OUTPATIENT
Start: 2022-12-13 | End: 2022-12-20 | Stop reason: HOSPADM

## 2022-12-13 RX ORDER — ONDANSETRON 4 MG/1
4 TABLET, ORALLY DISINTEGRATING ORAL EVERY 8 HOURS PRN
Status: DISCONTINUED | OUTPATIENT
Start: 2022-12-13 | End: 2022-12-20 | Stop reason: HOSPADM

## 2022-12-13 RX ORDER — ONDANSETRON 4 MG/1
4 TABLET, FILM COATED ORAL EVERY 8 HOURS PRN
Qty: 15 TABLET | Refills: 0 | Status: SHIPPED | OUTPATIENT
Start: 2022-12-13

## 2022-12-13 RX ORDER — FENTANYL CITRATE 50 UG/ML
INJECTION, SOLUTION INTRAMUSCULAR; INTRAVENOUS PRN
Status: DISCONTINUED | OUTPATIENT
Start: 2022-12-13 | End: 2022-12-13 | Stop reason: SDUPTHER

## 2022-12-13 RX ORDER — PROCHLORPERAZINE EDISYLATE 5 MG/ML
5 INJECTION INTRAMUSCULAR; INTRAVENOUS
Status: DISCONTINUED | OUTPATIENT
Start: 2022-12-13 | End: 2022-12-13 | Stop reason: HOSPADM

## 2022-12-13 RX ORDER — FLECAINIDE ACETATE 50 MG/1
50 TABLET ORAL EVERY 12 HOURS SCHEDULED
Status: DISCONTINUED | OUTPATIENT
Start: 2022-12-13 | End: 2022-12-20 | Stop reason: HOSPADM

## 2022-12-13 RX ORDER — VANCOMYCIN HYDROCHLORIDE 1 G/20ML
INJECTION, POWDER, LYOPHILIZED, FOR SOLUTION INTRAVENOUS PRN
Status: DISCONTINUED | OUTPATIENT
Start: 2022-12-13 | End: 2022-12-13 | Stop reason: HOSPADM

## 2022-12-13 RX ORDER — SODIUM CHLORIDE 0.9 % (FLUSH) 0.9 %
5-40 SYRINGE (ML) INJECTION PRN
Status: DISCONTINUED | OUTPATIENT
Start: 2022-12-13 | End: 2022-12-20 | Stop reason: HOSPADM

## 2022-12-13 RX ORDER — SODIUM CHLORIDE 9 MG/ML
INJECTION, SOLUTION INTRAVENOUS PRN
Status: DISCONTINUED | OUTPATIENT
Start: 2022-12-13 | End: 2022-12-20 | Stop reason: HOSPADM

## 2022-12-13 RX ORDER — ASPIRIN 325 MG
325 TABLET ORAL 2 TIMES DAILY
Qty: 28 TABLET | Refills: 0 | Status: CANCELLED | OUTPATIENT
Start: 2022-12-13 | End: 2022-12-27

## 2022-12-13 RX ORDER — DOCUSATE SODIUM 100 MG/1
100 CAPSULE, LIQUID FILLED ORAL 2 TIMES DAILY
Status: DISCONTINUED | OUTPATIENT
Start: 2022-12-13 | End: 2022-12-20 | Stop reason: HOSPADM

## 2022-12-13 RX ORDER — SODIUM CHLORIDE 9 MG/ML
INJECTION, SOLUTION INTRAVENOUS PRN
Status: DISCONTINUED | OUTPATIENT
Start: 2022-12-13 | End: 2022-12-13 | Stop reason: HOSPADM

## 2022-12-13 RX ORDER — BUPIVACAINE HYDROCHLORIDE 2.5 MG/ML
INJECTION, SOLUTION EPIDURAL; INFILTRATION; INTRACAUDAL PRN
Status: DISCONTINUED | OUTPATIENT
Start: 2022-12-13 | End: 2022-12-13 | Stop reason: ALTCHOICE

## 2022-12-13 RX ORDER — PRIMIDONE 50 MG/1
50 TABLET ORAL EVERY 12 HOURS SCHEDULED
Status: DISCONTINUED | OUTPATIENT
Start: 2022-12-13 | End: 2022-12-20 | Stop reason: HOSPADM

## 2022-12-13 RX ORDER — SENNA PLUS 8.6 MG/1
1 TABLET ORAL DAILY PRN
Status: DISCONTINUED | OUTPATIENT
Start: 2022-12-13 | End: 2022-12-20 | Stop reason: HOSPADM

## 2022-12-13 RX ORDER — LABETALOL HYDROCHLORIDE 5 MG/ML
INJECTION, SOLUTION INTRAVENOUS PRN
Status: DISCONTINUED | OUTPATIENT
Start: 2022-12-13 | End: 2022-12-13 | Stop reason: SDUPTHER

## 2022-12-13 RX ORDER — OXYCODONE AND ACETAMINOPHEN 7.5; 325 MG/1; MG/1
1 TABLET ORAL EVERY 4 HOURS PRN
Qty: 28 TABLET | Refills: 0 | Status: SHIPPED | OUTPATIENT
Start: 2022-12-13 | End: 2022-12-20

## 2022-12-13 RX ORDER — DEXAMETHASONE SODIUM PHOSPHATE 4 MG/ML
INJECTION, SOLUTION INTRA-ARTICULAR; INTRALESIONAL; INTRAMUSCULAR; INTRAVENOUS; SOFT TISSUE PRN
Status: DISCONTINUED | OUTPATIENT
Start: 2022-12-13 | End: 2022-12-13 | Stop reason: SDUPTHER

## 2022-12-13 RX ORDER — ALBUTEROL SULFATE 90 UG/1
AEROSOL, METERED RESPIRATORY (INHALATION)
COMMUNITY

## 2022-12-13 RX ORDER — FEXOFENADINE HCL 180 MG/1
TABLET ORAL
Status: ON HOLD | COMMUNITY
End: 2022-12-13

## 2022-12-13 RX ORDER — PANTOPRAZOLE SODIUM 40 MG/1
40 TABLET, DELAYED RELEASE ORAL
Status: DISCONTINUED | OUTPATIENT
Start: 2022-12-14 | End: 2022-12-20 | Stop reason: HOSPADM

## 2022-12-13 RX ORDER — CETIRIZINE HYDROCHLORIDE 10 MG/1
10 TABLET ORAL DAILY
COMMUNITY

## 2022-12-13 RX ORDER — ONDANSETRON 2 MG/ML
4 INJECTION INTRAMUSCULAR; INTRAVENOUS EVERY 6 HOURS PRN
Status: DISCONTINUED | OUTPATIENT
Start: 2022-12-13 | End: 2022-12-20 | Stop reason: HOSPADM

## 2022-12-13 RX ORDER — METOPROLOL SUCCINATE 50 MG/1
50 TABLET, EXTENDED RELEASE ORAL ONCE
Status: COMPLETED | OUTPATIENT
Start: 2022-12-13 | End: 2022-12-13

## 2022-12-13 RX ORDER — PREGABALIN 150 MG/1
150 CAPSULE ORAL ONCE
Status: COMPLETED | OUTPATIENT
Start: 2022-12-13 | End: 2022-12-13

## 2022-12-13 RX ORDER — OXYCODONE HYDROCHLORIDE 5 MG/1
10 TABLET ORAL EVERY 4 HOURS PRN
Status: DISCONTINUED | OUTPATIENT
Start: 2022-12-13 | End: 2022-12-20 | Stop reason: HOSPADM

## 2022-12-13 RX ORDER — AMLODIPINE BESYLATE 2.5 MG/1
2.5 TABLET ORAL DAILY
Status: DISCONTINUED | OUTPATIENT
Start: 2022-12-14 | End: 2022-12-20 | Stop reason: HOSPADM

## 2022-12-13 RX ORDER — SODIUM CHLORIDE 0.9 % (FLUSH) 0.9 %
5-40 SYRINGE (ML) INJECTION EVERY 12 HOURS SCHEDULED
Status: DISCONTINUED | OUTPATIENT
Start: 2022-12-13 | End: 2022-12-20 | Stop reason: HOSPADM

## 2022-12-13 RX ADMIN — SODIUM CHLORIDE, POTASSIUM CHLORIDE, SODIUM LACTATE AND CALCIUM CHLORIDE: 600; 310; 30; 20 INJECTION, SOLUTION INTRAVENOUS at 06:59

## 2022-12-13 RX ADMIN — ESMOLOL HYDROCHLORIDE 10 MG: 10 INJECTION, SOLUTION INTRAVENOUS at 09:56

## 2022-12-13 RX ADMIN — METOPROLOL SUCCINATE 50 MG: 50 TABLET, EXTENDED RELEASE ORAL at 21:40

## 2022-12-13 RX ADMIN — FENTANYL CITRATE 25 MCG: 50 INJECTION, SOLUTION INTRAMUSCULAR; INTRAVENOUS at 08:27

## 2022-12-13 RX ADMIN — PROPOFOL 20 MG: 10 INJECTION, EMULSION INTRAVENOUS at 08:27

## 2022-12-13 RX ADMIN — ALBUTEROL SULFATE 2.5 MG: 2.5 SOLUTION RESPIRATORY (INHALATION) at 10:38

## 2022-12-13 RX ADMIN — HYDROMORPHONE HYDROCHLORIDE 0.5 MG: 1 INJECTION, SOLUTION INTRAMUSCULAR; INTRAVENOUS; SUBCUTANEOUS at 11:07

## 2022-12-13 RX ADMIN — HYDROMORPHONE HYDROCHLORIDE 0.25 MG: 1 INJECTION, SOLUTION INTRAMUSCULAR; INTRAVENOUS; SUBCUTANEOUS at 14:50

## 2022-12-13 RX ADMIN — CEFTRIAXONE 2000 MG: 2 INJECTION, POWDER, FOR SOLUTION INTRAMUSCULAR; INTRAVENOUS at 07:59

## 2022-12-13 RX ADMIN — ASPIRIN 325 MG: 325 TABLET, COATED ORAL at 21:40

## 2022-12-13 RX ADMIN — ACETAMINOPHEN 650 MG: 325 TABLET, FILM COATED ORAL at 17:35

## 2022-12-13 RX ADMIN — ESMOLOL HYDROCHLORIDE 20 MG: 10 INJECTION, SOLUTION INTRAVENOUS at 08:30

## 2022-12-13 RX ADMIN — ESMOLOL HYDROCHLORIDE 10 MG: 10 INJECTION, SOLUTION INTRAVENOUS at 08:53

## 2022-12-13 RX ADMIN — METOPROLOL SUCCINATE 50 MG: 50 TABLET, EXTENDED RELEASE ORAL at 06:53

## 2022-12-13 RX ADMIN — ATORVASTATIN CALCIUM 40 MG: 40 TABLET, FILM COATED ORAL at 21:40

## 2022-12-13 RX ADMIN — ACETAMINOPHEN 650 MG: 325 TABLET, FILM COATED ORAL at 21:40

## 2022-12-13 RX ADMIN — PHENYLEPHRINE HYDROCHLORIDE 50 MCG: 10 INJECTION INTRAVENOUS at 08:22

## 2022-12-13 RX ADMIN — FENTANYL CITRATE 25 MCG: 50 INJECTION, SOLUTION INTRAMUSCULAR; INTRAVENOUS at 07:54

## 2022-12-13 RX ADMIN — SODIUM CHLORIDE, PRESERVATIVE FREE 5 ML: 5 INJECTION INTRAVENOUS at 21:45

## 2022-12-13 RX ADMIN — VANCOMYCIN HYDROCHLORIDE 1250 MG: 10 INJECTION, POWDER, LYOPHILIZED, FOR SOLUTION INTRAVENOUS at 06:59

## 2022-12-13 RX ADMIN — DOCUSATE SODIUM 100 MG: 100 CAPSULE, LIQUID FILLED ORAL at 21:43

## 2022-12-13 RX ADMIN — ONDANSETRON 4 MG: 2 INJECTION INTRAMUSCULAR; INTRAVENOUS at 09:39

## 2022-12-13 RX ADMIN — DOXYCYCLINE 50 MG: 50 CAPSULE ORAL at 12:53

## 2022-12-13 RX ADMIN — ROCURONIUM BROMIDE 40 MG: 10 INJECTION INTRAVENOUS at 07:55

## 2022-12-13 RX ADMIN — SUGAMMADEX 200 MG: 100 INJECTION, SOLUTION INTRAVENOUS at 09:53

## 2022-12-13 RX ADMIN — PROPOFOL 110 MG: 10 INJECTION, EMULSION INTRAVENOUS at 07:54

## 2022-12-13 RX ADMIN — LABETALOL HYDROCHLORIDE 5 MG: 5 INJECTION, SOLUTION INTRAVENOUS at 08:35

## 2022-12-13 RX ADMIN — MIRTAZAPINE 15 MG: 15 TABLET, FILM COATED ORAL at 21:41

## 2022-12-13 RX ADMIN — FLECAINIDE ACETATE 50 MG: 50 TABLET ORAL at 21:54

## 2022-12-13 RX ADMIN — HYDROXYZINE HYDROCHLORIDE 25 MG: 25 TABLET ORAL at 21:40

## 2022-12-13 RX ADMIN — ACETAMINOPHEN 1000 MG: 500 TABLET ORAL at 06:53

## 2022-12-13 RX ADMIN — ESMOLOL HYDROCHLORIDE 20 MG: 10 INJECTION, SOLUTION INTRAVENOUS at 08:28

## 2022-12-13 RX ADMIN — OXYCODONE 10 MG: 5 TABLET ORAL at 12:28

## 2022-12-13 RX ADMIN — PRIMIDONE 50 MG: 50 TABLET ORAL at 21:40

## 2022-12-13 RX ADMIN — DEXAMETHASONE SODIUM PHOSPHATE 8 MG: 4 INJECTION, SOLUTION INTRAMUSCULAR; INTRAVENOUS at 08:17

## 2022-12-13 RX ADMIN — PREGABALIN 150 MG: 150 CAPSULE ORAL at 06:53

## 2022-12-13 ASSESSMENT — PAIN DESCRIPTION - ORIENTATION
ORIENTATION: RIGHT

## 2022-12-13 ASSESSMENT — PAIN DESCRIPTION - DESCRIPTORS
DESCRIPTORS: ACHING

## 2022-12-13 ASSESSMENT — PAIN SCALES - GENERAL
PAINLEVEL_OUTOF10: 8
PAINLEVEL_OUTOF10: 5
PAINLEVEL_OUTOF10: 8
PAINLEVEL_OUTOF10: 9
PAINLEVEL_OUTOF10: 5
PAINLEVEL_OUTOF10: 0
PAINLEVEL_OUTOF10: 6
PAINLEVEL_OUTOF10: 5

## 2022-12-13 ASSESSMENT — PAIN DESCRIPTION - LOCATION
LOCATION: SHOULDER;BACK
LOCATION: SHOULDER;BACK
LOCATION: SHOULDER
LOCATION: SHOULDER;BACK

## 2022-12-13 ASSESSMENT — PAIN - FUNCTIONAL ASSESSMENT: PAIN_FUNCTIONAL_ASSESSMENT: 0-10

## 2022-12-13 ASSESSMENT — ENCOUNTER SYMPTOMS: SHORTNESS OF BREATH: 1

## 2022-12-13 ASSESSMENT — PAIN DESCRIPTION - PAIN TYPE
TYPE: CHRONIC PAIN;SURGICAL PAIN
TYPE: SURGICAL PAIN;CHRONIC PAIN
TYPE: CHRONIC PAIN
TYPE: CHRONIC PAIN;SURGICAL PAIN

## 2022-12-13 ASSESSMENT — COPD QUESTIONNAIRES: CAT_SEVERITY: SEVERE

## 2022-12-13 NOTE — PROGRESS NOTES
Pt admitted to [address], with belongings at bedside. Pt has suit case, 2 belonging bags, and a cpap machine. 4 eyes complete, see additional note. Assessment complete. Pt A&Ox4, VSS on 4L of O2. Pt able to move all fingers, has a moderate grasp, and full sensation. Able to get up to the bedside commode x2 GB. Denies further needs at this time.

## 2022-12-13 NOTE — PROGRESS NOTES
Dr. Arcenio Dorsey here to see the pt, hospitalist to follow the pt, perfect served, also message left for

## 2022-12-13 NOTE — H&P
[address]  Same Day Surgery Update H & P  Department of General Surgery   Surgical Service   Pre-operative History and Physical  Last H & P within the last 30 days. DIAGNOSIS:   Right shoulder pain, unspecified chronicity [M25.511]    Procedure(s):  RIGHT REVERSE TOTAL SHOULDER REPLACEMENT     History obtained from: Patient interview and EHR     HISTORY OF PRESENT ILLNESS:   The patient is a 68 y.o. female with c/o right shoulder pain, weakness and limited ROM in the setting of severe glenohumeral arthrosis. Their symptoms have been recalcitrant to conservative treatment and the patient presents today for the above procedure. Illness Screening: Patient denies fever, chills, worsening cough, or close contact with sick individuals. Past Medical History:        Diagnosis Date    Arthritis     Asthma     CAD (coronary artery disease)     Cancer (HCC)     CHF (congestive heart failure) (HCC)     COPD (chronic obstructive pulmonary disease) (HCC)     Depression     Hypertension     Thyroid disease      Past Surgical History:        Procedure Laterality Date    ANKLE SURGERY Right     5 screws    CHOLECYSTECTOMY, LAPAROSCOPIC      HYSTERECTOMY, TOTAL ABDOMINAL (CERVIX REMOVED)      later cyst removed from scar    MASTECTOMY Right     with lymph node dissection times 5    TONSILLECTOMY         Medications Prior to Admission:      Prior to Admission medications    Medication Sig Start Date End Date Taking?  Authorizing Provider   cetirizine (ZYRTEC) 10 MG tablet Take 10 mg by mouth daily   Yes Historical Provider, MD   aspirin 81 MG EC tablet Take 81 mg by mouth daily   Yes Historical Provider, MD   docusate sodium (COLACE) 100 MG capsule Take 100 mg by mouth 2 times daily   Yes Historical Provider, MD   albuterol sulfate HFA (PROVENTIL;VENTOLIN;PROAIR) 108 (90 Base) MCG/ACT inhaler 2 puffs as needed    Historical Provider, MD   amLODIPine (NORVASC) 2.5 MG tablet TAKE 1 TABLET BY MOUTH ONCE DAILY 8/10/22   Historical Provider, MD   atorvastatin (LIPITOR) 40 MG tablet Take 40 mg by mouth nightly 8/5/22   Historical Provider, MD   bisacodyl (DULCOLAX) 5 MG EC tablet Take 5 mg by mouth    Historical Provider, MD   Calcium Polycarbophil (FIBER) 625 MG TABS Take 625 mg by mouth daily    Historical Provider, MD   doxazosin (CARDURA) 2 MG tablet Take 2 mg by mouth daily 8/5/22   Historical Provider, MD   ferrous sulfate (IRON 325) 325 (65 Fe) MG tablet Take 325 mg by mouth 2 times daily (with meals) 8/5/22   Historical Provider, MD   flecainide (TAMBOCOR) 50 MG tablet Take 50 mg by mouth every 12 hours 8/5/22   Historical Provider, MD   Fluticasone-Umeclidin-Vilant (TRELEGY ELLIPTA) 200-62.5-25 MCG/INH AEPB Inhale 1 puff into the lungs daily 8/5/22   Historical Provider, MD   hydrOXYzine HCl (ATARAX) 25 MG tablet Take 25 mg by mouth nightly 8/5/22   Historical Provider, MD   levothyroxine (SYNTHROID) 75 MCG tablet Take 75 mcg by mouth daily 8/5/22   Historical Provider, MD   metoprolol succinate (TOPROL XL) 50 MG extended release tablet Take 50 mg by mouth 2 times daily 8/5/22   Historical Provider, MD   mirtazapine (REMERON) 15 MG tablet Take 15 mg by mouth nightly 8/5/22   Historical Provider, MD   oxyCODONE-acetaminophen (PERCOCET) 7.5-325 MG per tablet Take 1 tablet by mouth every 6 hours as needed. Historical Provider, MD   pantoprazole (PROTONIX) 40 MG tablet  8/5/22   Historical Provider, MD   pregabalin (LYRICA) 25 MG capsule Take 25 mg by mouth 3 times daily.  4/17/22   Historical Provider, MD   primidone (MYSOLINE) 50 MG tablet Take 50 mg by mouth every 12 hours 8/5/22   Historical Provider, MD         Allergies:  Ibuprofen, Meperidine, Meperidine hcl, Olanzapine, Furosemide, and Lidocaine    PHYSICAL EXAM:      BP (!) 173/64   Pulse 69   Resp 22   Ht 5' 5\" (1.651 m)   Wt 183 lb (83 kg)   SpO2 95%   BMI 30.45 kg/m²      Airway:  Airway patent with no audible stridor    Heart: Regular rate and rhythm, No murmur noted    Lungs:  No increased work of breathing, good air exchange, clear to auscultation bilaterally, no crackles or wheezing    Abdomen:  Soft, non-distended, non-tender, no masses palpated    ASSESSMENT AND PLAN    Patient is a 68 y.o. female with above specified procedure planned. 1.  The patients history and physical was obtained and signed off by the pre-admission testing department. Patient seen and focused exam done today- no new changes since last physical exam on 12/5/22    2. Access to ancillary services are available per request of the provider.     NICK Adair - SHAKILA     12/13/2022

## 2022-12-13 NOTE — PROGRESS NOTES
PACU Transfer to Floor Note    Procedure(s):  RIGHT REVERSE TOTAL SHOULDER REPLACEMENT WITH OPEN BICEP TENODESIS    Current Allergies:     Pt meets criteria as per Pete Score and ASPAN Standards to transfer to next phase of care. No results for input(s): POCGLU in the last 72 hours. Vitals:    12/13/22 1515   BP: (!) 135/57   Pulse: 74   Resp: 15   Temp: 98.2 °F (36.8 °C)   SpO2:      Vitals within 20% of pt's admission vitals as per PETE SCORE    SpO2: 93 %    O2 Flow Rate (L/min): 4 L/min      Intake/Output Summary (Last 24 hours) at 12/13/2022 1533  Last data filed at 12/13/2022 1515  Gross per 24 hour   Intake 818 ml   Output 300 ml   Net 518 ml       Pain assessment:      Pain Level: 5    Patient was assessed for alterations to skin integrity. There were not alterations observed. Is patient incontinent: no    Handoff report given at bedside. Family updated and directed to pt room, tere called. Pt tolerating food and water.       12/13/2022 3:33 PM

## 2022-12-13 NOTE — ANESTHESIA PRE PROCEDURE
Department of Anesthesiology  Preprocedure Note       Name:  Leah Tristan   Age:  68 y.o.  :  1945                                          MRN:  [id number]         Date:  2022      Surgeon: Mony Montgomery):  Raphael Cueto MD    Procedure: Procedure(s):  RIGHT REVERSE TOTAL SHOULDER REPLACEMENT    Medications prior to admission:   Prior to Admission medications    Medication Sig Start Date End Date Taking?  Authorizing Provider   cetirizine (ZYRTEC) 10 MG tablet Take 10 mg by mouth daily   Yes Historical Provider, MD   aspirin 81 MG EC tablet Take 81 mg by mouth daily   Yes Historical Provider, MD   docusate sodium (COLACE) 100 MG capsule Take 100 mg by mouth 2 times daily   Yes Historical Provider, MD   albuterol sulfate HFA (PROVENTIL;VENTOLIN;PROAIR) 108 (90 Base) MCG/ACT inhaler 2 puffs as needed    Historical Provider, MD   amLODIPine (NORVASC) 2.5 MG tablet TAKE 1 TABLET BY MOUTH ONCE DAILY 8/10/22   Historical Provider, MD   atorvastatin (LIPITOR) 40 MG tablet Take 40 mg by mouth nightly 22   Historical Provider, MD   bisacodyl (DULCOLAX) 5 MG EC tablet Take 5 mg by mouth    Historical Provider, MD   Calcium Polycarbophil (FIBER) 625 MG TABS Take 625 mg by mouth daily    Historical Provider, MD   doxazosin (CARDURA) 2 MG tablet Take 2 mg by mouth daily 22   Historical Provider, MD   ferrous sulfate (IRON 325) 325 (65 Fe) MG tablet Take 325 mg by mouth 2 times daily (with meals) 22   Historical Provider, MD   flecainide (TAMBOCOR) 50 MG tablet Take 50 mg by mouth every 12 hours 22   Historical Provider, MD   Fluticasone-Umeclidin-Vilant (TRELEGY ELLIPTA) 200-62.5-25 MCG/INH AEPB Inhale 1 puff into the lungs daily 22   Historical Provider, MD   hydrOXYzine HCl (ATARAX) 25 MG tablet Take 25 mg by mouth nightly 22   Historical Provider, MD   levothyroxine (SYNTHROID) 75 MCG tablet Take 75 mcg by mouth daily 22   Historical Provider, MD   metoprolol succinate (TOPROL XL) 50 MG extended release tablet Take 50 mg by mouth 2 times daily 8/5/22   Historical Provider, MD   mirtazapine (REMERON) 15 MG tablet Take 15 mg by mouth nightly 8/5/22   Historical Provider, MD   oxyCODONE-acetaminophen (PERCOCET) 7.5-325 MG per tablet Take 1 tablet by mouth every 6 hours as needed. Historical Provider, MD   pantoprazole (PROTONIX) 40 MG tablet  8/5/22   Historical Provider, MD   pregabalin (LYRICA) 25 MG capsule Take 25 mg by mouth 3 times daily. 4/17/22   Historical Provider, MD   primidone (MYSOLINE) 50 MG tablet Take 50 mg by mouth every 12 hours 8/5/22   Historical Provider, MD       Current medications:    Current Facility-Administered Medications   Medication Dose Route Frequency Provider Last Rate Last Admin    lidocaine PF 1 % injection 1 mL  1 mL IntraDERmal Once PRN Ciara Little MD        lactated ringers infusion   IntraVENous Continuous Ciara Little MD        sodium chloride flush 0.9 % injection 5-40 mL  5-40 mL IntraVENous 2 times per day Ciara Little MD        sodium chloride flush 0.9 % injection 5-40 mL  5-40 mL IntraVENous PRN Ciara Little MD        0.9 % sodium chloride infusion   IntraVENous PRN Ciara Little MD        cefTRIAXone (ROCEPHIN) 2,000 mg in dextrose 5 % 50 mL IVPB mini-bag  2,000 mg IntraVENous Once Ron Osgood, MD        vancomycin (VANCOCIN) 1,250 mg in dextrose 5 % 250 mL IVPB  15 mg/kg IntraVENous Once Ron Osgood, MD        pregabalin (LYRICA) capsule 150 mg  150 mg Oral Once Ron Osgood, MD        acetaminophen (TYLENOL) tablet 1,000 mg  1,000 mg Oral Once Ron Osgood, MD        metoprolol succinate (TOPROL XL) extended release tablet 50 mg  50 mg Oral Once Doylene Current, DO           Allergies:     Allergies   Allergen Reactions    Ibuprofen Hives    Meperidine Hives    Meperidine Hcl      Other reaction(s): Unknown    Olanzapine      Other reaction(s): \"shakiness\"    Furosemide Swelling     Other reaction(s): Unknown  Pt states that she cannot take generic form (furosemide)  Face and throat swelling      Lidocaine Rash     Other reaction(s): Unknown  Other reaction(s): Unknown       Problem List:    Patient Active Problem List   Diagnosis Code    Acute on chronic combined systolic and diastolic congestive heart failure ([facility]) I50.43    Age-related osteoporosis without current pathological fracture M81.0    Bilateral edema of lower extremity R60.0    Chronic respiratory failure with hypoxia and hypercapnia ([facility]) J96.11, J96.12    COPD (chronic obstructive pulmonary disease) ([facility] J44.9       Past Medical History:        Diagnosis Date    Arthritis     Asthma     CAD (coronary artery disease)     Cancer ([facility]     CHF (congestive heart failure) ([facility]     [facility]     Depression     Hypertension     Thyroid disease        Past Surgical History:        Procedure Laterality Date    ANKLE SURGERY Right     5 screws    CHOLECYSTECTOMY, LAPAROSCOPIC      HYSTERECTOMY, TOTAL ABDOMINAL (CERVIX REMOVED)      later cyst removed from scar    MASTECTOMY Right     with lymph node dissection times 5    TONSILLECTOMY         Social History:    Social History     Tobacco Use    Smoking status: Never    Smokeless tobacco: Never   Substance Use Topics    Alcohol use: Not Currently                                Counseling given: Not Answered      Vital Signs (Current):   Vitals:    12/13/22 0553   BP: (!) 173/64   Pulse: 69   Resp: 22   SpO2: 95%   Weight: 183 lb (83 kg)   Height: 5' 5\" (1.651 m)                                              BP Readings from Last 3 Encounters:   12/13/22 (!) 173/64       NPO Status: Time of last liquid consumption: 2000                        Time of last solid consumption: 1830                        Date of last liquid consumption: 12/12/22                        Date of last solid food consumption: 12/12/22    BMI:   Wt Readings from Last 3 Encounters: 12/13/22 183 lb (83 kg)   12/06/22 182 lb (82.6 kg)     Body mass index is 30.45 kg/m². CBC: No results found for: WBC, RBC, HGB, HCT, MCV, RDW, PLT    CMP: No results found for: NA, K, CL, CO2, BUN, CREATININE, GFRAA, AGRATIO, LABGLOM, GLUCOSE, GLU, PROT, CALCIUM, BILITOT, ALKPHOS, AST, ALT    POC Tests: No results for input(s): POCGLU, POCNA, POCK, POCCL, POCBUN, POCHEMO, POCHCT in the last 72 hours. Coags: No results found for: PROTIME, INR, APTT    HCG (If Applicable): No results found for: PREGTESTUR, PREGSERUM, HCG, HCGQUANT     ABGs: No results found for: PHART, PO2ART, [id number], JKY1REV, BEART, [id number]     Type & Screen (If Applicable):  No results found for: LABABO, LABRH    Drug/Infectious Status (If Applicable):  No results found for: HIV, HEPCAB    COVID-19 Screening (If Applicable): No results found for: COVID19        Anesthesia Evaluation  Patient summary reviewed and Nursing notes reviewed no history of anesthetic complications:   Airway: Mallampati: III  TM distance: >3 FB   Neck ROM: full  Mouth opening: > = 3 FB   Dental:    (+) upper dentures      Pulmonary:   (+) COPD: severe,  shortness of breath: chronic,  decreased breath sounds: bilateral wheezes: scattered asthma:                            Cardiovascular:  Exercise tolerance: good (>4 METS),   (+) hypertension:, CAD:, dysrhythmias: atrial fibrillation, CHF:,     (-) orthopnea and PND    ECG reviewed  Rhythm: irregular  Rate: abnormal           Beta Blocker:  Dose within 24 Hrs         Neuro/Psych:   (+) neuromuscular disease:, psychiatric history:            GI/Hepatic/Renal:             Endo/Other:                     Abdominal:             Vascular: Other Findings:           Anesthesia Plan      general     ASA 3     (Patient has Hx of diaphragm paralysis, severe COPD, and local anesthetic allergy. We did discuss the possibility of remaining Ventilated after the procedure.  She understands the risk and would like to proceed.)  Induction: intravenous. MIPS: Postoperative opioids intended and Prophylactic antiemetics administered. Anesthetic plan and risks discussed with patient. Plan discussed with CRNA and attending.                     Ruddy Alicea DO   12/13/2022

## 2022-12-13 NOTE — PROGRESS NOTES
PT ARRIVED FORM or, S/P RIGHT REVERSE TOTAL SHOULDER REPLACEMENT WITH OPEN BICEP TENODESIS - Right, PT DID NOT HAVE A BLOCK DUE TO HISTORY OF C OPD AND PARTIAL paralysis of diaphragm, report received form CRNA.   Pt had exparel and local.  Sat 88-91% on arrival,  BS diminished

## 2022-12-13 NOTE — PROGRESS NOTES
Pt suitcase with CPAP, portable o2 +  (placed together in a belongings bag), and incont pads brought to bedside in PACU by daughter. All items labeled, will send up to room with pt when a room is assigned and ready.

## 2022-12-13 NOTE — CONSULTS
Hospital Medicine  Consult History & Physical        Chief Complaint:  post-op hypoxia    Date of Service: Pt seen/examined in consultation on 12/13/2022    History Of Present Illness:      68 y.o. female who we are asked to see/evaluate by Harish Bolden MD for post-op hypoxia. Pt is here for an elective Rt total shoulder replacement. She has a h/o copd w/ chronic resp failure on 2 liters of o2 by nc at baseline. She also has a h/o millie and used cpap at night. Postoperatively pt was very somnolent and dropped her sats below 90 on room air and had to be placed on o2. She was then placed on her home cpap w/ improvement of sats to 93-95%. Pt was somnolent on my arrival but woke up and was able to give me a decent hx. She reports     Past Medical History:        Diagnosis Date    A-fib ([facility] [address]     Arthritis     Asthma     CAD (coronary artery disease)     Cancer (HCC)     CHF (congestive heart failure) (HCC)     COPD (chronic obstructive pulmonary disease) (HCC)     CPAP     sleep apnea    Depression     Hyperlipidemia     Hypertension     Paralysis of diaphragm     partial right    Thyroid disease        Past Surgical History:        Procedure Laterality Date    ANKLE SURGERY Right     5 screws    CHOLECYSTECTOMY, LAPAROSCOPIC      HYSTERECTOMY, TOTAL ABDOMINAL (CERVIX REMOVED)      later cyst removed from scar    MASTECTOMY Right     with lymph node dissection times 5    TONSILLECTOMY         Medications Prior to Admission:    Prior to Admission medications    Medication Sig Start Date End Date Taking?  Authorizing Provider   cetirizine (ZYRTEC) 10 MG tablet Take 10 mg by mouth daily   Yes Historical Provider, MD   docusate sodium (COLACE) 100 MG capsule Take 100 mg by mouth 2 times daily   Yes Historical Provider, MD   senna (SENOKOT) 8.6 MG tablet Take 1 tablet by mouth 2 times daily as needed for Constipation 12/13/22 12/27/22 Yes Marito Mena MD   ondansetron (ZOFRAN) 4 MG tablet Take 1 tablet by mouth every 8 hours as needed for Nausea or Vomiting 12/13/22  Yes Miguel Gar MD   doxycycline hyclate (VIBRA-TABS) 100 MG tablet Take 1 tablet by mouth 2 times daily for 5 days 12/13/22 12/18/22 Yes Miguel Gar MD   oxyCODONE-acetaminophen (PERCOCET) 7.5-325 MG per tablet Take 1 tablet by mouth every 4 hours as needed for Pain for up to 7 days.  12/13/22 12/20/22 Yes Miguel Gar MD   aspirin 325 MG EC tablet Take 1 tablet by mouth daily Return to 81 mg daily after 4 weeks (increase for clot prophylaxis after surgery) 12/13/22  Yes Miguel Gar MD   albuterol sulfate HFA (PROVENTIL;VENTOLIN;PROAIR) 108 (90 Base) MCG/ACT inhaler 2 puffs as needed    Historical Provider, MD   amLODIPine (NORVASC) 2.5 MG tablet TAKE 1 TABLET BY MOUTH ONCE DAILY 8/10/22   Historical Provider, MD   atorvastatin (LIPITOR) 40 MG tablet Take 40 mg by mouth nightly 8/5/22   Historical Provider, MD   bisacodyl (DULCOLAX) 5 MG EC tablet Take 5 mg by mouth    Historical Provider, MD   Calcium Polycarbophil (FIBER) 625 MG TABS Take 625 mg by mouth daily    Historical Provider, MD   doxazosin (CARDURA) 2 MG tablet Take 2 mg by mouth daily 8/5/22   Historical Provider, MD   ferrous sulfate (IRON 325) 325 (65 Fe) MG tablet Take 325 mg by mouth 2 times daily (with meals) 8/5/22   Historical Provider, MD   flecainide (TAMBOCOR) 50 MG tablet Take 50 mg by mouth every 12 hours 8/5/22   Historical Provider, MD   Fluticasone-Umeclidin-Vilant (TRELEGY ELLIPTA) 200-62.5-25 MCG/INH AEPB Inhale 1 puff into the lungs daily 8/5/22   Historical Provider, MD   hydrOXYzine HCl (ATARAX) 25 MG tablet Take 25 mg by mouth nightly 8/5/22   Historical Provider, MD   levothyroxine (SYNTHROID) 75 MCG tablet Take 75 mcg by mouth daily 8/5/22   Historical Provider, MD   metoprolol succinate (TOPROL XL) 50 MG extended release tablet Take 50 mg by mouth 2 times daily 8/5/22   Historical Provider, MD   mirtazapine (REMERON) 15 MG tablet Take 15 mg by mouth nightly 8/5/22 Historical Provider, MD   pantoprazole (PROTONIX) 40 MG tablet  8/5/22   Historical Provider, MD   pregabalin (LYRICA) 25 MG capsule Take 25 mg by mouth 3 times daily. 4/17/22   Historical Provider, MD   primidone (MYSOLINE) 50 MG tablet Take 50 mg by mouth every 12 hours 8/5/22   Historical Provider, MD       Allergies:  Ibuprofen, Meperidine, Meperidine hcl, Olanzapine, Furosemide, Lidocaine, and Povidone-iodine    Social History:     TOBACCO:   reports that she has never smoked. She has never used smokeless tobacco.  ETOH:   reports that she does not currently use alcohol. Family History:  Nc    REVIEW OF SYSTEMS COMPLETED:   Pertinent positives as noted in the HPI. All other systems reviewed and negative. PHYSICAL EXAM PERFORMED:  BP (!) 119/54   Pulse 73   Temp 98.7 °F (37.1 °C) (Temporal)   Resp 15   Ht 5' 5\" (1.651 m)   Wt 183 lb (83 kg)   SpO2 95%   BMI 30.45 kg/m²   General appearance: somnolent on cpap- arousable  HEENT: Extra ocular muscles intact. Conjunctivae/corneas clear. Neck: Trachea midline. Respiratory:  Normal respiratory effort. Clear to auscultation, bilaterally without Rales/Wheezes/Rhonchi. Cardiovascular: Regular rate and rhythm   Abdomen: Soft, non-tender  Musculoskeletal: No edema  Skin: No rashes or lesions. Neurologic:  Neurovascularly intact without any focal sensory/motor deficits. Cranial nerves: II-XII intact, grossly non-focal.  Psychiatric: Alert and oriented, thought content appropriate, normal insight    Radiology: I have reviewed the radiology reports with the following interpretation:     ASSESSMENT/ PLAN:    Active Hospital Problems    Diagnosis Date Noted    S/P reverse total shoulder arthroplasty, right [Z96.611] 12/13/2022     Priority: Medium   Post-op pain control, dvt proph, pt/ot per ortho. Post-op Hypoxia:  2/2 anesthesia w/ underlying sleep apnea and copd-use cpap until pt fully awake and then cont qhs and prn w/ naps.   When off cpap place on o2 at 2 liters. COPD:  No acute exacerbation- only on albuterol at home- cont prn here. HTN:  On amlodipine and metoprolol- restart amlodipine in am. Restart bb tonight. A fib:  On flecainide and bb- cont.     DVT Prophylaxis: Per ortho  Diet: No diet orders on file  Code Status: No Order    PT/OT Eval Status: Active and ongoing    Dispo - TBD by primary team.    Thank you for the consultation, will follow up as needed    Electronically signed by Flori Yuan MD on 12/13/22 at 2:19 PM EST

## 2022-12-13 NOTE — PROGRESS NOTES
4 Eyes Admission Assessment     I agree as the admission nurse that 2 RN's have performed a thorough Head to Toe Skin Assessment on the patient. ALL assessment sites listed below have been assessed on admission. Areas assessed by both nurses:     [x]   Head, Face, and Ears   [x]   Shoulders, Back, and Chest  [x]   Arms, Elbows, and Hands   [x]   Coccyx, Sacrum, and Ischium- Blanchable redness to bottom. [x]   Legs, Feet, and Heels        Does the Patient have Skin Breakdown?   No         Armando Prevention initiated:  Yes   Wound Care Orders initiated:  No      [facility] nurse consulted for Pressure Injury (Stage 3,4, Unstageable, DTI, NWPT, and Complex wounds) or Armando score 18 or lower:  No      Nurse 1 eSignature: Electronically signed by Dewey Smith RN on 12/13/22 at 4:18 PM EST    **SHARE this note so that the co-signing nurse is able to place an eSignature**    Nurse 2 eSignature: Electronically signed by Britni Rodriguez RN on 12/13/22 at 5:19 PM EST

## 2022-12-13 NOTE — DISCHARGE INSTRUCTIONS
PATIENT INSTRUCTIONS FOLLOWING OUT-PATIENT   SHOULDER SURGERY      2. You may have to take the pain medicine every 4-8 hours for the first day or two. After this, you can wean off the medicine and just take it as needed. 3. All pain medications can make you constipated. Drink plenty of fluids and eat lots of fiber to combat this. If you go for more than 3 days without a bowel movement, try a laxative. We recommend Miralax, an over the counter laxative, and/or colace, an over the counter stool softener. 4. Getting comfortable for sleeping is difficult after this surgery. It gets easier after several days. Many patients sleep better in a reclining lounger like a Lay-Z-Boy or in bed with several pillows to help prop them up. 5. Ice packs or a commercial ice cuff/machine are very helpful to reduce pain and inflammation after the surgery. Since the bandage is so thick you can leave the ice bags on top until the shoulder gets cold. Dont ice bare skin however or you could get frostbite. 6. Anesthesia and the pain medication can cause nausea. If this is severe or leads to throwing up call our office at [phone] so we can call an anti-nausea medicine into your pharmacy. Have the pharmacy phone number handy--its on the prescription bottles. 7. You probably have your first post-op appointment already scheduled, along with your first physical therapy appointment. If not, call us at [phone] to schedule it. We like to see you 5-8 days after the operation. 8. Getting dressed. Huge shirts work well to cover the shoulder. We will show you how to put a shirt on at the first office visit so that you can then start wearing the shirt under the sling. 9. Have someone drive you to the first appointment. You will be in a big sling and still probably taking pain medication. That wouldnt look good if you got in an accident.     10. If we gave you arthroscopic snap shots of your repaired shoulder or elbow, please bring them with you to the first office visit so that we can review them together. 11. Keep the dressing dry. The bulky dressing can be removed after 3 days after which it is okay to shower. Until then, however, sponge baths should be done for hygeine. Do not remove the steri-strips. We typically have patients leave them on until they slowly curl up and fall off. The longer the steri-strips are on the prettier the wounds look. Leave the see-through mesh dressing beneath your bandages in place when you eventually remove your outer bandages, we will change it at your first post-operative appointment. 12. Any questions or problems contact us anytime at [phone]. GENERAL OR TWILIGHT ANESTHESIA    1. For a minimum of 24 hours:   No driving or operating heavy machinery   No alcohol, sleeping pills, or tranquilizers   No signing important legal documents  2. Limit your general activity and rest for 24 hours. Resume your normal activities as you begin to feel better. 3.  Eat lightly for your first meal.  Advance to regular diet as tolerated. 4.  Cough and deep breathe every hour while awake for the next 24 hours  5. If you have any problems and are unable to contact your doctor, go to the nearest emergency department. NERVE BLOCK INSTRUCTIONS      1. Your affected limb will be numb until the block starts to wear off  2. Do not use the affected limb until the block wears off  3. Start taking pain medication before the block wears off--expect pain to increase over the next 12 hours. Extra Heart Failure sites:   https://[email or web address].com/ --- this is American Heart Association interactive Healthier Living with Heart Failure guidebook. Please copy and paste link into search bar. Use your mouse to scroll through the pages.   Lots and lots of info / tips    [facility] [place] robin  --- free smart phone robin available for [employer] and [employer]. Use your phone to track sodium / fluid intake,  symptoms, weight, etc.    [employer] - website-- [employer] is a dialysis company. All dialysis patients follow a renal diet which IS low sodium!! This website offers free seasonal cookbooks.   Each quarter, they will release 25-30 new recipes with a breakdown of calories, sodium, glucose, etc    [email or web address]/recipes -- more free recipes

## 2022-12-13 NOTE — PROGRESS NOTES
Call placed to Dr [address]der [address] as pt untreated for UTI  [address] returned call. Pt asymptomatic, pt states has incontinence at times always. Pt already has vancomycin and rocephin ordered. No new orders. Pt spoke to Dr Keny Connolly, block will not be done preop as pt has partially paralyzed right diaphragm. T&S sent. Pt states has only had allergic reaction of rash to topical lidocaine.

## 2022-12-13 NOTE — PROGRESS NOTES
Xray done in pacu, RT here for resp treatment and changed O2 to venturi mask, pt breathing through mouth

## 2022-12-13 NOTE — ANESTHESIA POSTPROCEDURE EVALUATION
Department of Anesthesiology  Postprocedure Note    Patient: Leah Tristan  MRN: [id number]  YOB: 1945  Date of evaluation: 12/13/2022      Procedure Summary     Date: 12/13/22 Room / Location: [facility]    Anesthesia Start: 9861 Anesthesia Stop: 1102    Procedure: RIGHT REVERSE TOTAL SHOULDER REPLACEMENT WITH OPEN BICEP TENODESIS (Right: Shoulder) Diagnosis:       Right shoulder pain, unspecified chronicity      (Right shoulder pain, unspecified chronicity [M25.511])    Surgeons: Raphael Cueto MD Responsible Provider: Flonnie Aschoff, DO    Anesthesia Type: General ASA Status: 3          Anesthesia Type: General    Huber Phase I: Huber Score: 6    Huber Phase II:        Anesthesia Post Evaluation    Patient location during evaluation: PACU  Patient participation: complete - patient participated  Level of consciousness: awake  Pain score: 0  Airway patency: patent  Nausea & Vomiting: no nausea and no vomiting  Complications: no  Cardiovascular status: blood pressure returned to baseline  Respiratory status: acceptable  Hydration status: euvolemic  Multimodal analgesia pain management approach

## 2022-12-14 PROCEDURE — 6370000000 HC RX 637 (ALT 250 FOR IP): Performed by: INTERNAL MEDICINE

## 2022-12-14 PROCEDURE — 97162 PT EVAL MOD COMPLEX 30 MIN: CPT

## 2022-12-14 PROCEDURE — 97530 THERAPEUTIC ACTIVITIES: CPT

## 2022-12-14 PROCEDURE — 2580000003 HC RX 258

## 2022-12-14 PROCEDURE — 6370000000 HC RX 637 (ALT 250 FOR IP)

## 2022-12-14 PROCEDURE — 1200000000 HC SEMI PRIVATE

## 2022-12-14 PROCEDURE — 94150 VITAL CAPACITY TEST: CPT

## 2022-12-14 PROCEDURE — 94660 CPAP INITIATION&MGMT: CPT

## 2022-12-14 PROCEDURE — 94761 N-INVAS EAR/PLS OXIMETRY MLT: CPT

## 2022-12-14 PROCEDURE — 2700000000 HC OXYGEN THERAPY PER DAY

## 2022-12-14 PROCEDURE — 97166 OT EVAL MOD COMPLEX 45 MIN: CPT

## 2022-12-14 PROCEDURE — 97535 SELF CARE MNGMENT TRAINING: CPT

## 2022-12-14 RX ORDER — PREGABALIN 25 MG/1
25 CAPSULE ORAL 2 TIMES DAILY
Status: COMPLETED | OUTPATIENT
Start: 2022-12-14 | End: 2022-12-15

## 2022-12-14 RX ADMIN — DOXAZOSIN 2 MG: 2 TABLET ORAL at 09:03

## 2022-12-14 RX ADMIN — PRIMIDONE 50 MG: 50 TABLET ORAL at 09:03

## 2022-12-14 RX ADMIN — OXYCODONE 10 MG: 5 TABLET ORAL at 20:17

## 2022-12-14 RX ADMIN — PANTOPRAZOLE SODIUM 40 MG: 40 TABLET, DELAYED RELEASE ORAL at 06:11

## 2022-12-14 RX ADMIN — AMLODIPINE BESYLATE 2.5 MG: 2.5 TABLET ORAL at 09:03

## 2022-12-14 RX ADMIN — LEVOTHYROXINE SODIUM 75 MCG: 0.07 TABLET ORAL at 06:11

## 2022-12-14 RX ADMIN — FLECAINIDE ACETATE 50 MG: 50 TABLET ORAL at 20:16

## 2022-12-14 RX ADMIN — DOXYCYCLINE 50 MG: 50 CAPSULE ORAL at 20:18

## 2022-12-14 RX ADMIN — OXYCODONE 10 MG: 5 TABLET ORAL at 15:12

## 2022-12-14 RX ADMIN — ACETAMINOPHEN 650 MG: 325 TABLET, FILM COATED ORAL at 16:13

## 2022-12-14 RX ADMIN — ACETAMINOPHEN 650 MG: 325 TABLET, FILM COATED ORAL at 09:03

## 2022-12-14 RX ADMIN — SODIUM CHLORIDE, PRESERVATIVE FREE 10 ML: 5 INJECTION INTRAVENOUS at 09:12

## 2022-12-14 RX ADMIN — METOPROLOL SUCCINATE 50 MG: 50 TABLET, EXTENDED RELEASE ORAL at 20:17

## 2022-12-14 RX ADMIN — DOXYCYCLINE 50 MG: 50 CAPSULE ORAL at 09:04

## 2022-12-14 RX ADMIN — ATORVASTATIN CALCIUM 40 MG: 40 TABLET, FILM COATED ORAL at 20:16

## 2022-12-14 RX ADMIN — FLECAINIDE ACETATE 50 MG: 50 TABLET ORAL at 09:04

## 2022-12-14 RX ADMIN — CETIRIZINE HYDROCHLORIDE 10 MG: 10 TABLET, FILM COATED ORAL at 09:03

## 2022-12-14 RX ADMIN — MIRTAZAPINE 15 MG: 15 TABLET, FILM COATED ORAL at 20:17

## 2022-12-14 RX ADMIN — DOCUSATE SODIUM 100 MG: 100 CAPSULE, LIQUID FILLED ORAL at 09:03

## 2022-12-14 RX ADMIN — METOPROLOL SUCCINATE 50 MG: 50 TABLET, EXTENDED RELEASE ORAL at 09:03

## 2022-12-14 RX ADMIN — PRIMIDONE 50 MG: 50 TABLET ORAL at 20:16

## 2022-12-14 RX ADMIN — HYDROXYZINE HYDROCHLORIDE 25 MG: 25 TABLET ORAL at 20:16

## 2022-12-14 RX ADMIN — DOCUSATE SODIUM 100 MG: 100 CAPSULE, LIQUID FILLED ORAL at 20:17

## 2022-12-14 RX ADMIN — ASPIRIN 325 MG: 325 TABLET, COATED ORAL at 09:03

## 2022-12-14 RX ADMIN — OXYCODONE 10 MG: 5 TABLET ORAL at 10:37

## 2022-12-14 RX ADMIN — PREGABALIN 25 MG: 25 CAPSULE ORAL at 22:55

## 2022-12-14 RX ADMIN — OXYCODONE 10 MG: 5 TABLET ORAL at 06:11

## 2022-12-14 RX ADMIN — ASPIRIN 325 MG: 325 TABLET, COATED ORAL at 20:16

## 2022-12-14 ASSESSMENT — PAIN SCALES - GENERAL
PAINLEVEL_OUTOF10: 0
PAINLEVEL_OUTOF10: 0
PAINLEVEL_OUTOF10: 8
PAINLEVEL_OUTOF10: 8

## 2022-12-14 ASSESSMENT — PAIN DESCRIPTION - ORIENTATION: ORIENTATION: RIGHT

## 2022-12-14 ASSESSMENT — PAIN DESCRIPTION - LOCATION: LOCATION: SHOULDER

## 2022-12-14 NOTE — PROGRESS NOTES
Patient alert and oriented x 4. VSS with an exception of elevated BP. Tolerating fluids and diet. Take medication www. No change during this shift. No c/o of pain, Numbness or tingling. Skin CDI, Shoulder immobilizer in place. Use bed side commode X 2. Incentive spirometer performed with patient. Dentures in a denture cup  in the bathroom per patient request. All fall precaution in place. Will continue to monitor.

## 2022-12-14 NOTE — PLAN OF CARE
Problem: Chronic Conditions and Co-morbidities  Goal: Patient's chronic conditions and co-morbidity symptoms are monitored and maintained or improved  12/14/2022 0956 by [place] Brizuela RN  Outcome: Progressing  12/14/2022 0507 by Johnna Kee RN  Outcome: Progressing     Problem: Discharge Planning  Goal: Discharge to home or other facility with appropriate resources  12/14/2022 0956 by [place] Brizuela RN  Outcome: Progressing  12/14/2022 0507 by oJhnna Kee RN  Outcome: Progressing     Problem: Pain  Goal: Verbalizes/displays adequate comfort level or baseline comfort level  12/14/2022 0956 by [place] Brizuela RN  Outcome: Progressing  12/14/2022 0507 by Johnna Kee RN  Outcome: Progressing     Problem: Respiratory - Adult  Goal: Achieves optimal ventilation and oxygenation  12/14/2022 0956 by [place] Brizuela RN  Outcome: Progressing  12/14/2022 0507 by Johnna Kee RN  Outcome: Progressing     Problem: Skin/Tissue Integrity  Goal: Absence of new skin breakdown  Description: 1. Monitor for areas of redness and/or skin breakdown  2. Assess vascular access sites hourly  3. Every 4-6 hours minimum:  Change oxygen saturation probe site  4. Every 4-6 hours:  If on nasal continuous positive airway pressure, respiratory therapy assess nares and determine need for appliance change or resting period.   12/14/2022 0956 by Florida Brizuela RN  Outcome: Progressing  12/14/2022 0507 by Johnna Kee RN  Outcome: Progressing     Problem: ABCDS Injury Assessment  Goal: Absence of physical injury  12/14/2022 0956 by [place] Brizuela RN  Outcome: Progressing  12/14/2022 0507 by Johnna Kee RN  Outcome: Progressing     Problem: Safety - Adult  Goal: Free from fall injury  12/14/2022 0956 by [place] Brizuela RN  Outcome: Progressing  12/14/2022 0507 by Johnna Kee RN  Outcome: Progressing

## 2022-12-14 NOTE — OP NOTE
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 PROCEDURES PERFORMED:  Right shoulder arthrotomy, open biceps tenodesis,  reverse total shoulder replacement. ANESTHESIA:  General anesthesia, interscalene block. INTRAVENOUS FLUIDS:  400 mL of crystalloid. ESTIMATED BLOOD LOSS:  200 mL. COMPLICATIONS:  None. SURGEON:  Tayo Arroyo MD    ASSISTANT:  Margi Resendez MD.  The availability of Dr. Gabe Morgan as a  skilled first assistant was critically important to execute this  procedure. The patient had a very tight shoulder needing extra set of  hands for retraction. He also had to manipulate the arm throughout the  procedure because how soft the bone was in order to avoid further  injury. He was also critically important during the subscapularis  repair for suture management. IMPLANTS:  [employer] incorporated short stem small shell AltiVate size  10, 32 neutral liner, 32 -4 glenosphere, standard baseplate, four  locking screws. Modifier 22 applied because of complexity brought on by the fact that  the patient had a combination of a very tight shoulder with large and  thickened inferior capsule as well as a very soft bone, which led to   Lesser tuberosity cavitation.  This likely resulted from a very tight  Shoulder during retraction despite extensive releases to improve   Glenoid exposure. This added roughly 20 minutes or 33% to 40% of the  time compared to an otherwise uncomplicated reverse shoulder  replacement. FINDINGS:  Examination under anesthesia revealed focal motion deficits. Arthrotomy revealed a very thick pseudocapsule and scar. Although there  was no substantial rotator cuff tearing, there was extensive  bursal-sided degeneration of the rotator cuff. It was essentially  scarred in and did not appear to be functional.  A small portion we had  to be released and later reattached. We were able to do a reverse  shoulder replacement. Bone quality was very poor, especially on the  humeral side. Portions of the lesser tuberosity cavitated and needed to  be pieced together; however, still able to get a subscapularis repair  with good tendon-to-bone apposition. Biceps was tendinopathic amenable  to tenotomy and then tenodesis. BRIEF HISTORY AND PRESENTING ILLNESS:  As follows: The patient is a  59-year-old woman with multiple comorbid conditions including severe  COPD and oxygen dependency, also ambulates with a walker. She is a poor  candidate for surgery, but we saw no alternative, elected to do surgery  because she had failed multimodal conservative treatment including  multiple injections, activity modification, analgesics. She has severe  pain in her shoulder and has advancing arthritis. I recommended reverse  total shoulder replacement. She was cleared by Pulmonary and Cardiology  and Medicine. She did not receive a nerve block. She understood the  risks including bleeding, infection, anesthetic risks, injury to nerve  and blood vessels, stiffness, weakness, incomplete pain relief, and need  for further surgery. She understood that she will need to convalesce in  skilled nursing facility versus rehab unit. She gave her informed  consent. She was scheduled on elective basis after appropriate  preoperative medical clearance.     OPERATIVE PROCEDURE:  On the date of the procedure, the patient was  brought back to the operating room, placed supine on the operating room  table. General anesthesia was established. Preoperative antibiotics  and interscalene block were given in the holding area. Nerve block was  not performed. Under anesthesia, exam was carried out with the findings  as noted above. The patient was positioned in [address] chair  position in a semi-sitting position. Trunk was elevated to 45 degrees. All pressure points were padded. The patient's right shoulder and arm  were prepped and draped in a standard manner for shoulder replacement  surgery. We used a clear cover rather than Ioban because of an allergy. A [employer] Spider was used for arm positioning. All members of the  surgical team wore body exhaust suits. After calling timeout, we made a  10 cm oblique incision from the coracoid coursing down towards the level  of the deltoid tubercle. The incision was carried out with a skin knife  through the skin and subcutaneous tissue. Electrocautery was used to  establish hemostasis. Gelpi retractor was placed. Deltopectoral  interval was identified and developed using a combination of blunt and  sharp dissection. The cephalic vein was retracted laterally, preserved  throughout the procedure. We released the upper border of pectoralis. We released the clavipectoral fascia portion that was quite thick. We  released subdeltoid adhesions. We placed Hohmann retractors and placed  the finger ridge retractors. We identified transverse humeral ligament. We opened it up with cautery. We could see the amount of tenosynovitis. We tenodesed the biceps to the upper border of the pectoralis major  tendon using two #2 Ethibond figure-of-eight stitches. The tendon  proximal to this was excised. We released quite a bit of bursal  adhesions with Arevalo elevator and excised portions of the distal  clavicle.   We took down the subscapularis sharply off the lesser  tuberosity using cautery, cauterized the anterior circumflex vessel. We  released the inferior capsule. We placed Hohmann retractor and finally  dislocated the humeral head. We removed medium-sized osteophytes. We  used appropriate cutting guide in appropriate height and retroversion  and pinned the guide in place. We used an oscillating saw to resect the  humeral head. Osteophytes came into view peripherally and these were  resected. Bone quality was soft. We turned our attention towards the  glenoid. We mobilized the subscapularis, excised the rotator interval  scarring in the anterior capsule. We tagged the subscapularis. We  excised the biceps stump and the labrum circumferentially using cautery. We repositioned our retractors. We used a Fukuda retractor but at one  point we had to remove it because the [address] retractor was cutting into  the very sharp metaphyseal bone to the humerus. At this point, we were  still able to get very good glenoid exposure. We excised portions of  the inferior capsule, which was quite thick. We had already using our  CT scan and [facility], we knew the correct retroversion and superior  tilt; so, we drilled our guidewire freehand incorporating but correcting  this deformity. We measured just under 30 mm and inserted our tap. We  reamed this over the tap with a starter and a small reamer. Reaming  going inferiorly and anteriorly. We reamed concentrically. We used a  gildardo to remove a little bit of bone at the periphery. Once we had good  reaming, we removed the tap. We irrigated copiously and inserted our  baseplate. We had excellent scratch fit. We placed our guide drill for  peripheral locking screws. We recorded screw lengths using calibrated  drill bit. We inserted screws under power and then hand tightened each  of these screws. We used a gildardo around the periphery.   We were in a  little trouble trying to get the small glenosphere because of how thick  the shoulder was. After removing all retractors, we were able to just  use the finger technique to retract the humerus after releasing a little  bit more inferior capsule. We got the 32 -4 glenosphere and impacted  that in place. We inserted the set screw and engaged with a  torque-limited . In order to dislocate the humeral head, we had  to recess the small portion of the superior rotator cuff because of how  tight the shoulder was. We dislocated the humeral head. We had  compressed down portion of the lesser tuberosity. We were still able to  preserve that. We just had to sound the canal.  We sounded it to a 12,  but elected to use a 10 implant. We did not broach or overream because  of how soft the bone was. We used a wire-passing drill bit and drilled  six drill holes, about 2 cm of the ostium incised and bypassing the area  where the bone had collapsed a little bit. Again, #2 Ethibond sutures  through each of these drill holes, we were able to get the subscapularis  repaired to the lesser tuberosity. Prior to this, we irrigated one  final time and brought in our implant, which was the short stem, small  shell AltiVate type stem. We impacted in place and seated it. It did  subside a little bit initially, but we still felt we had good enough  height and we trialed the 32 neutral liner and reduced very nicely. It  was not too loose, not too tight. So, we replaced the trial liner to  definitive liner and impacted that into place. We reduced the shoulder  one final time. We were still pushing down on the small little  fragments of the lesser tuberosity bone. These were actually adherent  to the porous coating of the shell. We passed the suture from the  osteotomy site to the subscapularis from top to bottom and tightened in  this manner routinely, bringing the arm to about 45 degrees of external  rotation.   We then appositionally placed one more Ethibond suture  between the released subscapularis and top of the rotator interval for  added subscapularis fixation. We irrigated copiously. We did  infiltrate both 0.5% and bupivacaine and 0.5% Marcaine in the  subcutaneous tissue including the capsular tissue around the implant. We then placed 1 gm of vancomycin powder. We closed the wound in layers  using 0 Vicryl in a figure-of-eight fashion to close the deltopectoral  interval.  We used 2-0 Vicryl in interrupted inverted fashion to close  the subcutaneous tissue. Routine skin closure with 4-0 Monocryl closure  and Prineo dressing to close the skin. Sterile compressive dressing was  applied. The arm was placed in a padded soft brace. The patient was  repositioned in the supine position before this, promptly awakened from  anesthesia having tolerated the procedure well, and taken from the  operating room to the recovery room in satisfactory condition. PLAN:  The patient will be admitted for medical management, IV  antibiotics, and . She will be strict nonweightbearing  on the right side with gait and transfer. She would likely go to a  skilled nursing facility or rehab unit. Follow up with me in the office  in two weeks.         Pat Barton MD    D: 12/13/2022 16:25:16       T: 12/14/2022 1:12:11     [email or web address]  Job#: [id number]     Doc#: [id number]    CC:

## 2022-12-14 NOTE — CARE COORDINATION
Case Management Assessment  Initial Evaluation    Date/Time of Evaluation: 12/14/2022 4:00 PM  Assessment Completed by: Nica Yee RN    If patient is discharged prior to next notation, then this note serves as note for discharge by case management. Patient Name: Lin Gallegos                   YOB: 1945  Diagnosis: Right shoulder pain, unspecified chronicity [M25.511]  S/P reverse total shoulder arthroplasty, right [Z96.611]                   Date / Time: 12/13/2022  5:40 AM    Patient Admission Status: Inpatient   Readmission Risk (Low < 19, Mod (19-27), High > 27): Readmission Risk Score: 8.8    Current PCP: Martell Luque, DO  PCP verified by CM? No    Chart Reviewed: Yes      History Provided by: Patient  Patient Orientation: Alert and Oriented    Patient Cognition: Alert    Hospitalization in the last 30 days (Readmission):  No    If yes, Readmission Assessment in CM Navigator will be completed. Advance Directives:      Code Status: Full Code   Patient's Primary Decision Maker is: Legal Next of Kin      Discharge Planning:    Patient lives with: Alone Type of Home: Apartment  Primary Care Giver: Self  Patient Support Systems include: Family Members   Current Financial resources:    Current community resources:    Current services prior to admission: C-pap, Durable Medical Equipment, Oxygen Therapy ([facility])            Current DME: Walker            Type of Home Care services:  None    ADLS  Prior functional level: Independent in ADLs/IADLs  Current functional level:      PT AM-PAC: 7 /24  OT AM-PAC: 12 /24    Family can provide assistance at DC: No  Would you like Case Management to discuss the discharge plan with any other family members/significant others, and if so, who? No  Plans to Return to Present Housing: No  Other Identified Issues/Barriers to RETURNING to current housing: none  Potential Assistance needed at discharge:  Other (Comment) (possible SNF/rehab)            Potential DME: Patient expects to discharge to: Acute rehab  Plan for transportation at discharge:      Financial    Payor: Hernan Mirthaflip / Plan: Mary Kay Fernandes ESSENTIAL/PLUS / Product Type: *No Product type* /     Does insurance require precert for SNF: Yes    Potential assistance Purchasing Medications:    Meds-to-Beds request:        [address] [address]  Phone: [phone] Fax: [phone]      Notes:    Factors facilitating achievement of predicted outcomes: Family support    Barriers to discharge: Decreased endurance and Lower extremity weakness    Additional Case Management Notes:   Patient is from home alone has home oxygen with [employer], portable O2 concentrator, has cpap, uses a walker at baseline. Patient is agreeable to acute rehab placement. SW working on placement. The Plan for Transition of Care is related to the following treatment goals of Right shoulder pain, unspecified chronicity [M25.511]  S/P reverse total shoulder arthroplasty, right [R55.884]    IF APPLICABLE: The Patient and/or patient representative Nance Soulier and her family were provided with a choice of provider and agrees with the discharge plan. Freedom of choice list with basic dialogue that supports the patient's individualized plan of care/goals and shares the quality data associated with the providers was provided to:     Patient Representative Name:       The Patient and/or Patient Representative Agree with the Discharge Plan?       Mireya Leong RN  Case Management Department  Ph: [phone] Fax: [phone]

## 2022-12-14 NOTE — PROGRESS NOTES
Physical Therapy  Facility/Department: [facility] 5T ORTHO/NEURO  Physical Therapy Initial Assessment and Treatment    Name: Oscar Spears  : 1945  MRN: [id number]  Date of Service: 2022    Discharge Recommendations:  Oscar Spears scored a 7/24 on the AM-PAC short mobility form. Current research shows that an AM-PAC score of 17 or less is typically not associated with a discharge to the patient's home setting. Based on the patient's AM-PAC score and their current functional mobility deficits, it is recommended that the patient have 3-5 sessions per week of Physical Therapy at d/c to increase the patient's independence. Please see assessment section for further patient specific details. PT Equipment Recommendations  Equipment Needed: No      Assessment   Assessment: Pt from home s/p right reverse total shoulder replacement. Pt is baseline mobility impairments, needing an upright rolling walker for safe mobility. Pt now NWB to R shoulder with immobilizer in place and unable to use walker for transfers/gait. Pt needing 2 person assist for safe mobility. Good effort despite increased R shoulder pain. Pt lives alone and would benefit from continued PT at d/c to increase strength and maximize mobility. Will follow. Treatment Diagnosis: Decreased gait associated with R reverse total shoulder. Decision Making: Medium Complexity  Requires PT Follow-Up: Yes     Plan   General Plan: 1 time a day 7 days a week    Safety Devices  Type of Devices: Left in chair, Chair alarm in place, Call light within reach, Nurse notified (Viridiana wiley with RN staff)     Restrictions  Other position/activity restrictions: NWB R UE, shoulder immobilizer     Subjective   Pain: 8-9/10 shoulder pain, RN aware and pt just had pain meds    Chart Reviewed: Yes  Additional Pertinent Hx: Pt admitted  with R shoulder pain s/p reverse total shoulder replacement.   PMH:  asthma, COPD, HTN, OA, CAD, CHF, depression, cancer, Afib  Diagnosis: R shoulder pain    Subjective  Subjective: Pt found supine in bed. \"It just hurts. My hips hurt. I have really bad arthritis everywhere. \"    Social/Functional History  Lives With: Alone  Type of Home: Senior housing apartment  Home Layout: One level  Home Access: Level entry  Bathroom Shower/Tub: Tub/Shower unit  Bathroom Toilet: Standard (toilet safety frame)  Bathroom Equipment: Tub transfer bench, Grab bars in shower (pull cord in bathroom)  Home Equipment: Oxygen, Lift chair, Alert Button (Upright 4 wheeled walker, 2L Home O2)  Has the patient had two or more falls in the past year or any fall with injury in the past year?: No (1 fall in April- resulted in broken ribs)  ADL Assistance: Needs assistance (HHA 2x week for showers, Pt struggles with dressing)  Homemaking Assistance: Needs assistance (Daughter-in-law does laundry, cleaning and grocery shopping. Pt makes microwave meals)  Ambulation Assistance: Independent (Upright 4 weeled walker)  Transfer Assistance: Independent  Active : No  Additional Comments: Pt hoping to go to [employer] in [place]: Within Functional Limits (wears glasses)  Hearing: Within functional limits      Cognition   Within Functional Limits     Objective   Gross Assessment  AROM: Generally decreased, functional  Strength: Generally decreased, functional     Bed mobility  Supine to Sit: Moderate assistance (HOB raised, using rail, max effort by patient)    Transfers  Sit to Stand: Moderate Assistance;2 Person Assistance (from EOB to hand held assist and from EOB to [address])  Stand to Sit: Moderate Assistance;2 Person Assistance (for controlled descent)  Bed to Chair: Dependent/Total (via [address] st[address])    Balance  Sitting - Static: Fair (sits with a right sided lean, \"That's because by breast is large. \")  Sitting - Dynamic: Fair  Standing - Static: Poor (Mod A x2 with hand held support)  Standing - Dynamic:  (Unable to assess)      AM-PAC Score  AM-PAC Inpatient Mobility Raw Score : 7 (12/14/22 1146)  AM-PAC Inpatient T-Scale Score : 26.42 (12/14/22 1146)  Mobility Inpatient CMS 0-100% Score: 92.36 (12/14/22 1146)  Mobility Inpatient CMS G-Code Modifier : CM (12/14/22 1146)      Goals  Short Term Goals  Time Frame for Short Term Goals: Discharge  Short Term Goal 1: supine <> sit SBA  Short Term Goal 2: sit <> stand Min A  Short Term Goal 3: bed <> chair Mod A x2  Patient Goals   Patient Goals : Return home       Education  Patient educated on shoulder immobilizer; including purpose, donning/doffing, fit/positioning, and wear schedule as ordered by surgical team. Patient verbalized and performed return demonstration with assistance from therapists, confirming understanding.         Therapy Time   Individual Concurrent Group Co-treatment   Time In 8481         Time Out 1132         Minutes 41         Timed Code Treatment Minutes:  30  Total Treatment Minutes:  Giles Laird, PT

## 2022-12-14 NOTE — PROGRESS NOTES
Hospitalist Progress Note      PCP: Katy Sosa DO    Date of Admission: 12/13/2022    Subjective:   Pt reports feeling ok overall w/o complaints of cp or sob. Urinating w/o difficulty and passing gas but no bm. No n/v.     Medications:  Reviewed    Infusion Medications    sodium chloride       Scheduled Medications    sodium chloride flush  5-40 mL IntraVENous 2 times per day    acetaminophen  650 mg Oral Q6H    aspirin  325 mg Oral BID    doxycycline monohydrate  50 mg Oral 2 times per day    amLODIPine  2.5 mg Oral Daily    atorvastatin  40 mg Oral Nightly    cetirizine  10 mg Oral Daily    docusate sodium  100 mg Oral BID    doxazosin  2 mg Oral Daily    flecainide  50 mg Oral 2 times per day    hydrOXYzine HCl  25 mg Oral Nightly    levothyroxine  75 mcg Oral Daily    metoprolol succinate  50 mg Oral BID    mirtazapine  15 mg Oral Nightly    pantoprazole  40 mg Oral QAM AC    primidone  50 mg Oral 2 times per day     PRN Meds: sodium chloride flush, sodium chloride, ondansetron **OR** ondansetron, oxyCODONE **OR** oxyCODONE, HYDROmorphone **OR** HYDROmorphone, senna, albuterol, albuterol      Intake/Output Summary (Last 24 hours) at 12/14/2022 0842  Last data filed at 12/14/2022 0650  Gross per 24 hour   Intake 928 ml   Output 300 ml   Net 628 ml       Physical Exam Performed:    /67   Pulse 67   Temp 97.8 °F (36.6 °C) (Oral)   Resp 18   Ht 5' 5\" (1.651 m)   Wt 187 lb 6.3 oz (85 kg)   SpO2 94%   BMI 31.18 kg/m²     General appearance: No apparent distress, appears stated age and cooperative. HEENT: Pupils equal, round, and reactive to light. Conjunctivae/corneas clear. Neck: Supple, with full range of motion. No jugular venous distention. Trachea midline. Respiratory: On 4 liters 02 w/ sats 96%. . Normal respiratory effort. Diminished bs as not taking deep breaths. Cardiovascular: Regular rate and rhythm with normal S1/S2 without murmurs, rubs or gallops.   Abdomen: Soft, non-tender  Musculoskeletal: no edema b/l le  Neurologic:  Grossly non-focal.  Psychiatric: Alert and oriented, thought content appropriate, normal insight    Labs:   No results for input(s): WBC, HGB, HCT, PLT in the last 72 hours. No results for input(s): NA, K, CL, CO2, BUN, CREATININE, CALCIUM, PHOS in the last 72 hours. Invalid input(s): MAGNES  No results for input(s): AST, ALT, BILIDIR, BILITOT, ALKPHOS in the last 72 hours. No results for input(s): INR in the last 72 hours. No results for input(s): Leny Coombes in the last 72 hours. Urinalysis:    No results found for: Alvena Cassis, BACTERIA, RBCUA, BLOODU, [address]    Radiology:  XR SHOULDER RIGHT 1 VW   Final Result      Frontal view demonstrates right shoulder arthroplasty in standard position with postoperative changes. IP CONSULT TO SOCIAL WORK  IP CONSULT TO CASE MANAGEMENT  IP CONSULT TO HOSPITALIST    Assessment/Plan:    Active Hospital Problems    Diagnosis     S/P reverse total shoulder arthroplasty, right [Z96.611]      Priority: Medium   Post-op pain control, dvt proph, pt/ot per ortho. Post-op Hypoxia:  Currently on 4 liters by nc. Likely atelectasis. Initially postop was likely exacerbated by anesthesia w/ underlying sleep apnea and copd-use cpap until pt fully awake and then cont qhs and prn w/ naps. Home o2 eval today. Encourage IS. COPD:  No acute exacerbation- only on albuterol at home- cont prn here. HTN:  On amlodipine and metoprolol- restart amlodipine in am. Restart bb tonight. A fib:  On flecainide and bb- cont. Diet: ADULT DIET; Regular; Low Fat/Low Chol/High Fiber/2 gm Na  Code Status: Full Code    Dispo - Ok for dc from a medical standpoint when ready per ortho. Home o2 eval- likely can go back down to 2 liters which is her baseline otherwise slowly wean at dc.       Wallis Alpers, MD

## 2022-12-14 NOTE — PROGRESS NOTES
Patient alert and oriented x4; VSS on baseline 2 L NC. Pain controled with PRN pain medication per MAR. Ambulating x2 stand and pivot to the [facility]. Tolerating PO fluids and meals.

## 2022-12-14 NOTE — PROGRESS NOTES
Orthopaedic Surgery Fellow Post Operative right Reverse Total Shoulder Rounding Progress Note    History of Present Illness:  Katy Car is a 68 y.o. female patient who was seen this morning. There were no events overnight. Pain is controlled. They currently deny any dizziness, fevers, chills, shortness of breath, chest pain, paresthesias, or any other current complaints. Medical History:  Patient's medications, allergies, past medical, surgical, social and family histories were reviewed and updated as appropriate. They are as noted. Social History     Socioeconomic History    Marital status:      Spouse name: Not on file    Number of children: Not on file    Years of education: Not on file    Highest education level: Not on file   Occupational History    Not on file   Tobacco Use    Smoking status: Never    Smokeless tobacco: Never   Vaping Use    Vaping Use: Never used   Substance and Sexual Activity    Alcohol use: Not Currently    Drug use: Not on file    Sexual activity: Not on file   Other Topics Concern    Not on file   Social History Narrative    Not on file     Social Determinants of Health     Financial Resource Strain: Not on file   Food Insecurity: Not on file   Transportation Needs: Not on file   Physical Activity: Not on file   Stress: Not on file   Social Connections: Not on file   Intimate Partner Violence: Not on file   Housing Stability: Not on file       Allergies   Allergen Reactions    Ibuprofen Hives    Meperidine Hives    Meperidine Hcl      Other reaction(s): Unknown    Olanzapine      Other reaction(s): \"shakiness\"    Furosemide Swelling     Other reaction(s): Unknown  Pt states that she cannot take generic form (furosemide)  Face and throat swelling      Lidocaine Rash     Other reaction(s): Unknown  Other reaction(s): Unknown    Povidone-Iodine Rash     TOPICAL IODINE     No current facility-administered medications on file prior to encounter.      Current Outpatient Medications on File Prior to Encounter   Medication Sig Dispense Refill    cetirizine (ZYRTEC) 10 MG tablet Take 10 mg by mouth daily      docusate sodium (COLACE) 100 MG capsule Take 100 mg by mouth 2 times daily      albuterol sulfate HFA (PROVENTIL;VENTOLIN;PROAIR) 108 (90 Base) MCG/ACT inhaler 2 puffs as needed      amLODIPine (NORVASC) 2.5 MG tablet TAKE 1 TABLET BY MOUTH ONCE DAILY      atorvastatin (LIPITOR) 40 MG tablet Take 40 mg by mouth nightly      bisacodyl (DULCOLAX) 5 MG EC tablet Take 5 mg by mouth      Calcium Polycarbophil (FIBER) 625 MG TABS Take 625 mg by mouth daily      doxazosin (CARDURA) 2 MG tablet Take 2 mg by mouth daily      ferrous sulfate (IRON 325) 325 (65 Fe) MG tablet Take 325 mg by mouth 2 times daily (with meals)      flecainide (TAMBOCOR) 50 MG tablet Take 50 mg by mouth every 12 hours      Fluticasone-Umeclidin-Vilant (TRELEGY ELLIPTA) 200-62.5-25 MCG/INH AEPB Inhale 1 puff into the lungs daily      hydrOXYzine HCl (ATARAX) 25 MG tablet Take 25 mg by mouth nightly      levothyroxine (SYNTHROID) 75 MCG tablet Take 75 mcg by mouth daily      metoprolol succinate (TOPROL XL) 50 MG extended release tablet Take 50 mg by mouth 2 times daily      mirtazapine (REMERON) 15 MG tablet Take 15 mg by mouth nightly      pantoprazole (PROTONIX) 40 MG tablet       pregabalin (LYRICA) 25 MG capsule Take 25 mg by mouth 3 times daily. primidone (MYSOLINE) 50 MG tablet Take 50 mg by mouth every 12 hours       family history is not on file.   Past Medical History:   Diagnosis Date    A-fib [facility])     Arthritis     Asthma     CAD (coronary artery disease)     Cancer (HCC)     CHF (congestive heart failure) (HCC)     COPD (chronic obstructive pulmonary disease) (HCC)     CPAP     sleep apnea    Depression     Hyperlipidemia     Hypertension     Paralysis of diaphragm     partial right    Thyroid disease      Past Medical History:   Diagnosis Date    A-fib ([facility] [address]     Arthritis     Asthma     CAD (coronary artery disease)     Cancer (HCC)     CHF (congestive heart failure) (HCC)     COPD (chronic obstructive pulmonary disease) (HCC)     CPAP     sleep apnea    Depression     Hyperlipidemia     Hypertension     Paralysis of diaphragm     partial right    Thyroid disease      Active Ambulatory Problems     Diagnosis Date Noted    Acute on chronic combined systolic and diastolic congestive heart failure ([facility] [address] 04/11/2017    Age-related osteoporosis without current pathological fracture 07/11/2022    Bilateral edema of lower extremity 02/29/2016    Chronic respiratory failure with hypoxia and hypercapnia ([facility] [address] 11/30/2021    COPD (chronic obstructive pulmonary disease) ([facility] 04/09/2021     Resolved Ambulatory Problems     Diagnosis Date Noted    No Resolved Ambulatory Problems     Past Medical History:   Diagnosis Date    A-fib (HCC)     Arthritis     Asthma     CAD (coronary artery disease)     Cancer (HCC)     CHF (congestive heart failure) (HCC)     CPAP     Depression     Hyperlipidemia     Hypertension     Paralysis of diaphragm     Thyroid disease        Review of Systems:  Gen: No dizziness, fevers, chills  HEENT: Negative for double vision, visual changes  CV: Negative for chest pain, palpitations  Lungs: Negative for shortness of breath or wheezing  Abdomen: Negative for abdominal pain or bloating   Neurological: Negative for numbness, paresthesias, or weakness  Psychiatric: alert and oriented to person, place and time. Urological: Negative for urinary retention    Vital Signs:  Vitals:    12/14/22 0641   BP: 127/67   Pulse: 67   Resp: 18   Temp: 97.8 °F (36.6 °C)   SpO2: 94%     Height: 5' 5\" (165.1 cm), Weight: 187 lb 6.3 oz (85 kg), Body mass index is 31.18 kg/m². ,    Physical Exam:     Appearance: Braulio Hurt is alert, oriented x 3, resting comforably, no acute distress. right Shoulder Exam:  Sling is in place. Dressing is clean, dry, and intact.  Compartments are soft and compressible in the arm and forearm. Motor intact with wrist and thumb extension, finger and wrist flexion, finger abduction, and pinch. Sensation intact in the axillary, radial, median and ulnar nerves. 2/2 Radial  pulse, handwarm with brisk capillary refill < 2 seconds. Assessment:  1). S/p right Reverse total shoulder arthroplasty, post-operative day 1    Treatment Plan:    1). Maintain dressing, reinforce as needed per nursing  2). Pain control PRN  3). DVT ppx  4). Ice PRN  5). Encourage diet  6). GI ppx  7). PT/OT, increase activity as tolerated per protocol, NWB and Sling Full Time  8). Nutritional support  9). Hospitalist recommendations    Disposition: Anticipate discharge potentially today, if disposition arranged.    pending patient progression      Carole Granados MD [facility]    Orthopaedic Surgeon, Clinical Fellow  [address] [facility]   On behalf of

## 2022-12-14 NOTE — PLAN OF CARE

## 2022-12-14 NOTE — CARE COORDINATION
UPDATE:   [id number] is unable to accept due to lack of bed availability for this week. CM spoke with Jocelyn Springer at [facility] who reports they can accept and she has spoken with the pt's Viktoriya Bustillo, who is in agreement. Jocelyn Springer is starting precert today. CM will continue to follow for discharge planning. Electronically signed by ROSITA Quinn on 12/14/2022 at 3:13 PM  [phone]    [facility] following: CM met with pt at bedside this AM. Pt requested a referral to [facility] in [place] and suggested CM speak with her POA, her daughter-in-law. CM spoke with Viktoriya Bustillo, who requested referrals to [facility], [place] [id number] [facility], [address] [place] [address] and [facility]. Referrals have been faxed to all locations at this time except [id number] as [facility] is pending a call back from [address].  Contact numbers are:    [facility] - [phone] (phone) [phone] (fax)  [facility] - [phone] (phone) [phone] (fax)  [facility] [phone] (phone) [phone] (fax)  [address] and/or Willam Lopez [phone] (phone)  Electronically signed by ROSITA Quinn on 12/14/2022 at 2:20 PM  [phone]

## 2022-12-15 LAB
HCT VFR BLD CALC: 31.9 % (ref 36–48)
HEMOGLOBIN: 10.2 G/DL (ref 12–16)
MCH RBC QN AUTO: 31.6 PG (ref 26–34)
MCHC RBC AUTO-ENTMCNC: 32 G/DL (ref 31–36)
MCV RBC AUTO: 98.7 FL (ref 80–100)
PDW BLD-RTO: 14.1 % (ref 12.4–15.4)
PLATELET # BLD: 104 K/UL (ref 135–450)
PMV BLD AUTO: 9 FL (ref 5–10.5)
RBC # BLD: 3.23 M/UL (ref 4–5.2)
WBC # BLD: 5.3 K/UL (ref 4–11)

## 2022-12-15 PROCEDURE — 6370000000 HC RX 637 (ALT 250 FOR IP): Performed by: INTERNAL MEDICINE

## 2022-12-15 PROCEDURE — 36415 COLL VENOUS BLD VENIPUNCTURE: CPT

## 2022-12-15 PROCEDURE — 97530 THERAPEUTIC ACTIVITIES: CPT

## 2022-12-15 PROCEDURE — 1200000000 HC SEMI PRIVATE

## 2022-12-15 PROCEDURE — 85027 COMPLETE CBC AUTOMATED: CPT

## 2022-12-15 PROCEDURE — 2700000000 HC OXYGEN THERAPY PER DAY

## 2022-12-15 PROCEDURE — 94150 VITAL CAPACITY TEST: CPT

## 2022-12-15 PROCEDURE — 6370000000 HC RX 637 (ALT 250 FOR IP)

## 2022-12-15 PROCEDURE — 94761 N-INVAS EAR/PLS OXIMETRY MLT: CPT

## 2022-12-15 PROCEDURE — 97535 SELF CARE MNGMENT TRAINING: CPT

## 2022-12-15 PROCEDURE — 2580000003 HC RX 258

## 2022-12-15 RX ORDER — PREGABALIN 25 MG/1
25 CAPSULE ORAL 3 TIMES DAILY
Status: DISCONTINUED | OUTPATIENT
Start: 2022-12-15 | End: 2022-12-20 | Stop reason: HOSPADM

## 2022-12-15 RX ADMIN — PANTOPRAZOLE SODIUM 40 MG: 40 TABLET, DELAYED RELEASE ORAL at 05:11

## 2022-12-15 RX ADMIN — DOXAZOSIN 2 MG: 2 TABLET ORAL at 09:37

## 2022-12-15 RX ADMIN — LEVOTHYROXINE SODIUM 75 MCG: 0.07 TABLET ORAL at 05:11

## 2022-12-15 RX ADMIN — OXYCODONE 10 MG: 5 TABLET ORAL at 15:48

## 2022-12-15 RX ADMIN — OXYCODONE 10 MG: 5 TABLET ORAL at 09:37

## 2022-12-15 RX ADMIN — OXYCODONE 10 MG: 5 TABLET ORAL at 21:26

## 2022-12-15 RX ADMIN — AMLODIPINE BESYLATE 2.5 MG: 2.5 TABLET ORAL at 09:36

## 2022-12-15 RX ADMIN — DOXYCYCLINE 50 MG: 50 CAPSULE ORAL at 21:19

## 2022-12-15 RX ADMIN — ACETAMINOPHEN 650 MG: 325 TABLET, FILM COATED ORAL at 15:48

## 2022-12-15 RX ADMIN — OXYCODONE 10 MG: 5 TABLET ORAL at 05:11

## 2022-12-15 RX ADMIN — METOPROLOL SUCCINATE 50 MG: 50 TABLET, EXTENDED RELEASE ORAL at 21:20

## 2022-12-15 RX ADMIN — DOCUSATE SODIUM 100 MG: 100 CAPSULE, LIQUID FILLED ORAL at 09:37

## 2022-12-15 RX ADMIN — DOCUSATE SODIUM 100 MG: 100 CAPSULE, LIQUID FILLED ORAL at 21:19

## 2022-12-15 RX ADMIN — SENNOSIDES 8.6 MG: 8.6 TABLET, FILM COATED ORAL at 21:27

## 2022-12-15 RX ADMIN — ACETAMINOPHEN 650 MG: 325 TABLET, FILM COATED ORAL at 09:36

## 2022-12-15 RX ADMIN — HYDROXYZINE HYDROCHLORIDE 25 MG: 25 TABLET ORAL at 21:19

## 2022-12-15 RX ADMIN — FLECAINIDE ACETATE 50 MG: 50 TABLET ORAL at 09:42

## 2022-12-15 RX ADMIN — SODIUM CHLORIDE, PRESERVATIVE FREE 10 ML: 5 INJECTION INTRAVENOUS at 09:43

## 2022-12-15 RX ADMIN — ATORVASTATIN CALCIUM 40 MG: 40 TABLET, FILM COATED ORAL at 21:19

## 2022-12-15 RX ADMIN — MIRTAZAPINE 15 MG: 15 TABLET, FILM COATED ORAL at 21:20

## 2022-12-15 RX ADMIN — ASPIRIN 325 MG: 325 TABLET, COATED ORAL at 09:36

## 2022-12-15 RX ADMIN — FLECAINIDE ACETATE 50 MG: 50 TABLET ORAL at 21:19

## 2022-12-15 RX ADMIN — PRIMIDONE 50 MG: 50 TABLET ORAL at 21:19

## 2022-12-15 RX ADMIN — ACETAMINOPHEN 650 MG: 325 TABLET, FILM COATED ORAL at 05:11

## 2022-12-15 RX ADMIN — PRIMIDONE 50 MG: 50 TABLET ORAL at 09:42

## 2022-12-15 RX ADMIN — ASPIRIN 325 MG: 325 TABLET, COATED ORAL at 21:19

## 2022-12-15 RX ADMIN — DOXYCYCLINE 50 MG: 50 CAPSULE ORAL at 09:41

## 2022-12-15 RX ADMIN — ACETAMINOPHEN 650 MG: 325 TABLET, FILM COATED ORAL at 21:19

## 2022-12-15 RX ADMIN — CETIRIZINE HYDROCHLORIDE 10 MG: 10 TABLET, FILM COATED ORAL at 09:37

## 2022-12-15 RX ADMIN — PREGABALIN 25 MG: 25 CAPSULE ORAL at 09:37

## 2022-12-15 RX ADMIN — PREGABALIN 25 MG: 25 CAPSULE ORAL at 23:56

## 2022-12-15 RX ADMIN — METOPROLOL SUCCINATE 50 MG: 50 TABLET, EXTENDED RELEASE ORAL at 09:37

## 2022-12-15 ASSESSMENT — PAIN DESCRIPTION - LOCATION
LOCATION: BACK
LOCATION: SHOULDER
LOCATION: SHOULDER

## 2022-12-15 ASSESSMENT — PAIN DESCRIPTION - PAIN TYPE
TYPE: SURGICAL PAIN
TYPE: SURGICAL PAIN

## 2022-12-15 ASSESSMENT — PAIN SCALES - GENERAL
PAINLEVEL_OUTOF10: 5
PAINLEVEL_OUTOF10: 9
PAINLEVEL_OUTOF10: 9
PAINLEVEL_OUTOF10: 0
PAINLEVEL_OUTOF10: 6
PAINLEVEL_OUTOF10: 5

## 2022-12-15 ASSESSMENT — PAIN DESCRIPTION - ORIENTATION
ORIENTATION: RIGHT
ORIENTATION: RIGHT
ORIENTATION: MID

## 2022-12-15 ASSESSMENT — PAIN - FUNCTIONAL ASSESSMENT
PAIN_FUNCTIONAL_ASSESSMENT: PREVENTS OR INTERFERES SOME ACTIVE ACTIVITIES AND ADLS
PAIN_FUNCTIONAL_ASSESSMENT: PREVENTS OR INTERFERES SOME ACTIVE ACTIVITIES AND ADLS

## 2022-12-15 ASSESSMENT — PAIN DESCRIPTION - ONSET
ONSET: ON-GOING
ONSET: ON-GOING

## 2022-12-15 ASSESSMENT — PAIN DESCRIPTION - FREQUENCY
FREQUENCY: CONTINUOUS
FREQUENCY: CONTINUOUS

## 2022-12-15 ASSESSMENT — PAIN DESCRIPTION - DESCRIPTORS
DESCRIPTORS: ACHING
DESCRIPTORS: SORE;THROBBING
DESCRIPTORS: SORE

## 2022-12-15 NOTE — PLAN OF CARE
Problem: ABCDS Injury Assessment  Goal: Absence of physical injury  12/15/2022 0741 by Nat Robertson RN  Outcome: Progressing     Problem: Safety - Adult  Goal: Free from fall injury  12/15/2022 0741 by Nat Robertson RN  Outcome: Progressing   All fall precautions in place. Bed locked and in lowest position with alarm on. Overbed table and personal belonings within reach. Call light within reach and patient instructed to use call light for assistance. Non-skid socks on.

## 2022-12-15 NOTE — PROGRESS NOTES
Pt A&Ox4, VSS on 2L of O2 which is baseline for pt. Pt endorses continuous shoulder pain 8/10, treated with PRNs per MAR. Pt able to move all fingers, has a moderate grasp, and full sensation. No acute changes this shift. Voiding adequately via BSC. Tolerating PO diet and fluids. Denies further needs at this time. Call light within reach. Standard safety measures in place. Plan of care continues.

## 2022-12-15 NOTE — PROGRESS NOTES
Patient was c/o itching and notice skin tear and wiping under right axillary. Applied xeroform , abd pad and gauze with paper tape. Patient use bed side commode X 1, stand and pivot. Pain is managed Per MAR. Immobilizer in place. All fall precaution in place. Will continue to monitor.

## 2022-12-15 NOTE — PROGRESS NOTES
Orthopaedic Surgery Fellow Post Operative right Reverse Total Shoulder Rounding Progress Note    History of Present Illness:  Timothy Pham is a 68 y.o. female patient who was seen this morning. There were no events overnight. Pain is controlled. They currently deny any dizziness, fevers, chills, shortness of breath, chest pain, paresthesias, or any other current complaints. Medical History:  Patient's medications, allergies, past medical, surgical, social and family histories were reviewed and updated as appropriate. They are as noted. Social History     Socioeconomic History    Marital status:      Spouse name: Not on file    Number of children: Not on file    Years of education: Not on file    Highest education level: Not on file   Occupational History    Not on file   Tobacco Use    Smoking status: Never    Smokeless tobacco: Never   Vaping Use    Vaping Use: Never used   Substance and Sexual Activity    Alcohol use: Not Currently    Drug use: Not on file    Sexual activity: Not on file   Other Topics Concern    Not on file   Social History Narrative    Not on file     Social Determinants of Health     Financial Resource Strain: Not on file   Food Insecurity: Not on file   Transportation Needs: Not on file   Physical Activity: Not on file   Stress: Not on file   Social Connections: Not on file   Intimate Partner Violence: Not on file   Housing Stability: Not on file       Allergies   Allergen Reactions    Ibuprofen Hives    Meperidine Hives    Meperidine Hcl      Other reaction(s): Unknown    Olanzapine      Other reaction(s): \"shakiness\"    Furosemide Swelling     Other reaction(s): Unknown  Pt states that she cannot take generic form (furosemide)  Face and throat swelling      Lidocaine Rash     Other reaction(s): Unknown  Other reaction(s): Unknown    Povidone-Iodine Rash     TOPICAL IODINE     No current facility-administered medications on file prior to encounter.      Current Outpatient Medications on File Prior to Encounter   Medication Sig Dispense Refill    cetirizine (ZYRTEC) 10 MG tablet Take 10 mg by mouth daily      docusate sodium (COLACE) 100 MG capsule Take 100 mg by mouth 2 times daily      albuterol sulfate HFA (PROVENTIL;VENTOLIN;PROAIR) 108 (90 Base) MCG/ACT inhaler 2 puffs as needed      amLODIPine (NORVASC) 2.5 MG tablet TAKE 1 TABLET BY MOUTH ONCE DAILY      atorvastatin (LIPITOR) 40 MG tablet Take 40 mg by mouth nightly      bisacodyl (DULCOLAX) 5 MG EC tablet Take 5 mg by mouth      Calcium Polycarbophil (FIBER) 625 MG TABS Take 625 mg by mouth daily      doxazosin (CARDURA) 2 MG tablet Take 2 mg by mouth daily      ferrous sulfate (IRON 325) 325 (65 Fe) MG tablet Take 325 mg by mouth 2 times daily (with meals)      flecainide (TAMBOCOR) 50 MG tablet Take 50 mg by mouth every 12 hours      Fluticasone-Umeclidin-Vilant (TRELEGY ELLIPTA) 200-62.5-25 MCG/INH AEPB Inhale 1 puff into the lungs daily      hydrOXYzine HCl (ATARAX) 25 MG tablet Take 25 mg by mouth nightly      levothyroxine (SYNTHROID) 75 MCG tablet Take 75 mcg by mouth daily      metoprolol succinate (TOPROL XL) 50 MG extended release tablet Take 50 mg by mouth 2 times daily      mirtazapine (REMERON) 15 MG tablet Take 15 mg by mouth nightly      pantoprazole (PROTONIX) 40 MG tablet       pregabalin (LYRICA) 25 MG capsule Take 25 mg by mouth 3 times daily. primidone (MYSOLINE) 50 MG tablet Take 50 mg by mouth every 12 hours       family history is not on file.   Past Medical History:   Diagnosis Date    A-fib [facility])     Arthritis     Asthma     CAD (coronary artery disease)     Cancer (HCC)     CHF (congestive heart failure) (HCC)     COPD (chronic obstructive pulmonary disease) (HCC)     CPAP     sleep apnea    Depression     Hyperlipidemia     Hypertension     Paralysis of diaphragm     partial right    Thyroid disease      Past Medical History:   Diagnosis Date    A-fib ([facility] [address]     Arthritis     Asthma     CAD (coronary artery disease)     Cancer (HCC)     CHF (congestive heart failure) (HCC)     COPD (chronic obstructive pulmonary disease) (HCC)     CPAP     sleep apnea    Depression     Hyperlipidemia     Hypertension     Paralysis of diaphragm     partial right    Thyroid disease      Active Ambulatory Problems     Diagnosis Date Noted    Acute on chronic combined systolic and diastolic congestive heart failure ([facility] [address] 04/11/2017    Age-related osteoporosis without current pathological fracture 07/11/2022    Bilateral edema of lower extremity 02/29/2016    Chronic respiratory failure with hypoxia and hypercapnia ([facility] [address] 11/30/2021    COPD (chronic obstructive pulmonary disease) ([facility] 04/09/2021     Resolved Ambulatory Problems     Diagnosis Date Noted    No Resolved Ambulatory Problems     Past Medical History:   Diagnosis Date    A-fib (HCC)     Arthritis     Asthma     CAD (coronary artery disease)     Cancer (HCC)     CHF (congestive heart failure) ([facility])     CPAP     Depression     Hyperlipidemia     Hypertension     Paralysis of diaphragm     Thyroid disease        Review of Systems:  Gen: No dizziness, fevers, chills  HEENT: Negative for double vision, visual changes  CV: Negative for chest pain, palpitations  Lungs: Negative for shortness of breath or wheezing  Abdomen: Negative for abdominal pain or bloating   Neurological: Negative for numbness, paresthesias, or weakness  Psychiatric: alert and oriented to person, place and time. Urological: Negative for urinary retention    Vital Signs:  Vitals:    12/15/22 0300   BP: 127/67   Pulse: 80   Resp: 18   Temp: 97.5 °F (36.4 °C)   SpO2: 94%     Height: 5' 5\" (165.1 cm), Weight: 187 lb 6.3 oz (85 kg), Body mass index is 31.18 kg/m². ,    Physical Exam:     Appearance: Rodriguez Holding  resting comforably, no acute distress. right Shoulder Exam:  Sling is in place. Dressing is clean, dry, and intact. Compartments are soft and compressible in the arm and forearm.      Sleeping on rounds, elected not to wake this AM    No RN concerns overnight    Assessment:  1). S/p right Reverse total shoulder arthroplasty, post-operative day 2    Treatment Plan:    1). Maintain dressing, reinforce as needed per nursing  2). Pain control PRN  3). DVT ppx  4). Ice PRN  5). Encourage diet  6). GI ppx  7). PT/OT, increase activity as tolerated per protocol, NWB and Sling Full Time  8). Nutritional support  9).  Hospitalist recommendations    Disposition:   Clear for discharge from post surgical point of view once disposition determined (awaiting precert)    Crissy Hahn MD [facility]    Orthopaedic Surgeon, Clinical Fellow  [address] [facility]   On behalf of Dr Kraig Ruiz

## 2022-12-15 NOTE — PROGRESS NOTES
Physical Therapy    Daily Treatment Note      Discharge Recommendations:  Meme Arisa scored a 10/24 on the AM-PAC short mobility form. Current research shows that an AM-PAC score of 17 or less is typically not associated with a discharge to the patient's home setting. Based on the patient's AM-PAC score and their current functional mobility deficits, it is recommended that the patient have 3-5 sessions per week of Physical Therapy at d/c to increase the patient's independence. Please see assessment section for further patient specific details. Assessment:  Pt less groggy today, allowing for increased mobility. Pt requiring decreased assist for transfers and able to tolerate ambulation with hand held assist.  Pt with good effort and participation. Plan is for continued PT upon d/c to increase strength and maximize mobility prior to returning home. Will follow. Equipment Needs:  Defer    Chart Reviewed: Yes   Other position/activity restrictions: NWB R UE, shoulder immobilizer   Additional Pertinent Hx: Pt admitted 12/13 with R shoulder pain s/p reverse total shoulder replacement. PMH:  asthma, COPD, HTN, OA, CAD, CHF, depression, cancer, Afib      Diagnosis: R shoulder pain   Treatment Diagnosis: Decreased gait associated with R reverse total shoulder. Subjective:  Pt found supine in bed. \"I got real hot last night. \"  Pt agreeable for PT, requesting to use the bathroom. Pain:  R shoulder pain, not rated. RN aware. Objective:    Bed mobility  Supine to sit: Max A (HOB raised, verbal cues)    Transfers  Sit to stand:  Min A (from EOB, BSC and chair heights, using momentum)  Stand to sit:  Min A (cues/assist to lower self and to reach back with hands)  Bed <> BSC:  Mod A (stand pivot)    Ambulation  Assistance Level:   Mod A x2  Assistive device:  Hand held support  Distance:  5ft  Quality of gait:  decreased step length, decreased step height, unsteady gait  Other:  R shoulder immobilizer in place    Neuro/balance  Sitting balance:  Good  Static stance:  Min A with hand held support  Dynamic stance: Mod A x1-2 for transfers/ambulation    Patient Education  Patient educated on shoulder immobilizer; including purpose, donning/doffing, fit/positioning, and wear schedule as ordered by surgical team. Patient verbalized and performed return demonstration with assistance from therapists, confirming understanding. Safety Devices  Pt left with needs in reach, seated in chair with LEs elevated for comfort, alarm in place and RN aware. AM-PAC score  AM-PAC Inpatient Mobility Raw Score : 10  AM-PAC Inpatient T-Scale Score : 32.29  Mobility Inpatient CMS 0-100% Score: 76.75  Mobility Inpatient CMS G-Code Modifier : CL    Goals:  Time Frame for Short Term Goals: Discharge  Short Term Goal 1: supine <> sit SBA   Short Term Goal 2: sit <> stand Min A (goal met 12/15/2022 )  Short Term Goal 3: bed <> chair Mod A x2 (goal met 12/15/2022 )  Add New Goals:  Short term goal 2:  sit <> stand SBA  Short term goal 3:  bed <> chair SBA  Short term goal 4:  ambulate 30ft with LRAD CGA    Plan:  daily, 7x/week      Therapy Time    Individual  Concurrent  Group  Co-treatment    Time In  1040            Time Out  1120            Minutes  40              Timed Code Treatment Minutes:  40  Total Treatment Minutes:  40      If patient is discharged prior to next treatment, this note will serve as the discharge summary.     Adriana Benitez, PT

## 2022-12-15 NOTE — PROGRESS NOTES
Occupational Therapy  Facility/Department: [facility] 5T ORTHO/NEURO  Occupational Therapy Treatment Note     Name: Jayne Levin  : 1945  MRN: [id number]  Date of Service: 12/15/2022    Discharge Recommendations:Melina Fairchild scored a  on the AM-PAC ADL Inpatient form. Current research shows that an AM-PAC score of 17 or less is typically not associated with a discharge to the patient's home setting. Based on the patient's AM-PAC score and their current ADL deficits, it is recommended that the patient have 3-5 sessions per week of Occupational Therapy at d/c to increase the patient's independence. Please see assessment section for further patient specific details. If patient discharges prior to next session this note will serve as a discharge summary. Please see below for the latest assessment towards goals. OT Equipment Recommendations  Equipment Needed: No  Other: defer to [facility] care facility       Patient Diagnosis(es): The encounter diagnosis was Rotator cuff arthropathy of right shoulder. Past Medical History:  has a past medical history of A-fib ([facility] [address] Arthritis, Asthma, CAD (coronary artery disease), Cancer ([address] CHF (congestive heart failure) ([address] COPD (chronic obstructive pulmonary disease) ([facility] [address] CPAP, Depression, Hyperlipidemia, Hypertension, Paralysis of diaphragm, and Thyroid disease. Past Surgical History:  has a past surgical history that includes Hysterectomy, total abdominal; Ankle surgery (Right); Mastectomy (Right); Tonsillectomy; Cholecystectomy, laparoscopic; and shoulder surgery (Right, 2022). Treatment Diagnosis: impaired ADLs / functional mobility 2/2 R TSA / R shoulder immobilizer      Assessment   Performance deficits / Impairments: Decreased functional mobility ; Decreased ADL status; Decreased endurance  Assessment: Pt continues with ongoing deficits 2/2 RUE reverse R TSA, Pt uses rollator at baseline. Pt demo increased alertness / participation.  Pt Mod A with functional transfers and Mod A x 2 for functional mobility -limited. Pt dependent with Don/ doffing Shoulder immobilizer. Pt would benefit from ongoing inpt OT at d/c to maximize functional level prior to d/c home. Will follow as inpt. Treatment Diagnosis: impaired ADLs / functional mobility 2/2 R TSA / R shoulder immobilizer  Prognosis: Fair;Good  REQUIRES OT FOLLOW-UP: Yes  Activity Tolerance  Activity Tolerance: Patient limited by fatigue;Patient Tolerated treatment well  Activity Tolerance Comments: increased activity tolerance - remains on baseline O2 -2L. Pt demo PEREZ with mobility ? baseline        Plan   Occupational Therapy Plan  Times Per Week: 7x  Times Per Day: Once a day  Current Treatment Recommendations: Safety education & training, Self-Care / ADL, Patient/Caregiver education & training, Endurance training, Functional mobility training     Restrictions  Position Activity Restriction  Other position/activity restrictions: NWB R UE, shoulder immobilizer    Subjective   General  Chart Reviewed: Yes  Additional Pertinent Hx: Admit 12/13  s/p R TSA                                                        PMHX: Arthritis,  Asthma, CAD, CHF, COPD w/ chronic resp failure on 2 liters of o2 by nc at baseline , Depression , Hypertension and  Thyroid disease  Family / Caregiver Present: No  Diagnosis: s/p R TSA  Subjective  Subjective: \" I guess we will do what we can\" pt found in bed c/o wet gown and new blister in R auxiliary. RN aware - pt agreeable for OOB/OT tx. Pt assisted with gown change  General Comment  Comments: .      Social/Functional History  Social/Functional History  Lives With: Alone  Type of Home: Senior housing apartment  Home Layout: One level  Home Access: Level entry  Bathroom Shower/Tub: Tub/Shower unit  Bathroom Toilet: Standard (toilet safety frame)  Bathroom Equipment: Tub transfer bench, Grab bars in shower (pull cord in bathroom)  Home Equipment: Oxygen, Lift chair, Alert [employer] (Upright 4 wheeled walker, 2L Home O2)  Has the patient had two or more falls in the past year or any fall with injury in the past year?: No (1 fall in April- resulted in broken ribs)  ADL Assistance: Needs assistance (HHA 2x week for showers, Pt struggles with dressing)  Homemaking Assistance: Needs assistance (Daughter-in-law does laundry, cleaning and grocery shopping. Pt makes microwave meals)  Ambulation Assistance: Independent (Upright 4 wheeled walker)  Transfer Assistance: Independent  Active : No  Additional Comments: Pt hoping to go to [employer] in [facility]       Objective Treatment included functional transfer training, ADL's and pt. education. Safety Devices  Type of Devices: Left in chair;Chair alarm in place;Call light within reach;Nurse notified    Gait  Distance (ft): 5 Feet (BSC to chair with HHA -LUE)  Toilet Transfers  Toilet - Technique: Stand step  Equipment Used: Standard bedside commode  Toilet Transfer: Moderate assistance  Toilet Transfers Comments: With HHA with LUE     ADL  Feeding: Setup; Increased time to complete  Feeding Skilled Clinical Factors: using LUE  Grooming: Setup;Minimal assistance  Grooming Skilled Clinical Factors: increased effort- with LUE  UE Dressing: Dependent/Total  UE Dressing Skilled Clinical Factors: to don/ doff Shoulder Immobilizer--- Pt has open blister / skin tear in R auxiliary - RN aware. Max  A with donning new gown  LE Dressing: Dependent/Total  LE Dressing Skilled Clinical Factors: unable to reach past knees - would benefit from AE on next attempt  Toileting: Dependent/Total;Maximum assistance  Toileting Skilled Clinical Factors: with pericare - requires Min A in stance for balance. using purewick 2/2 urge incont at baseline  Additional Comments: Pt needing increased time / effort 2/2 RUE TSA. Pt using LUE to assist with all ADLs.         Bed mobility  Supine to Sit: Maximum assistance (pt going to R side of bed - limited ability to use BLE to pull up on)  Scooting: Moderate assistance  Bed Mobility Comments: Pt scooting into chair with Min with L armrest and weight shifting  Transfers  Sit to stand: Minimal assistance  Stand to sit: Minimal assistance  Transfer Comments: v.cues for hand placement /tech     Cognition  Overall Cognitive Status: WFL  Cognition Comment: needing increased time with processing 2/2 pain meds  Orientation  Overall Orientation Status: Within Functional Limits     Education Given To: Patient  Education Provided: ADL Adaptive Strategies;Transfer Training;IADL Safety;Precautions; Equipment  Education Provided Comments: Ebenezer Malick / Darling Shoulder Immobilizer  Education Method: Demonstration;Verbal  Barriers to Learning: None  Education Outcome: Continued education needed     AM-PAC Score  AM-PAC Inpatient Daily Activity Raw Score: 13 (12/15/22 1144)  AM-PAC Inpatient ADL T-Scale Score : 32.03 (12/15/22 1144)  ADL Inpatient CMS 0-100% Score: 63.03 (12/15/22 1144)  ADL Inpatient CMS G-Code Modifier : CL (12/15/22 1144)    Goals  Short Term Goals  Time Frame for Short Term Goals: at d/c  Short Term Goal 1: Stance x 2 mins with CGA for ADLs / toileting assist - not met  Short Term Goal 2: Functional transfer with CGA and LRAD prn  - not met  Short Term Goal 3: Grooming with setup  - not met  Short Term Goal 4: Pt/ family demo donning / doffing Shoulder immobilizer with CGA  - not met  Patient Goals   Patient goals : Use my RUE again     Therapy Time   Individual Concurrent Group Co-treatment   Time In 1040         Time Out 1122         Minutes 42             Timed Code Treatment Minutes:   42 mins     Total Treatment Minutes:  43 mins       Jonas Zuniga OT

## 2022-12-15 NOTE — PROGRESS NOTES
Acute hypoxia and ams resolved. Now pt back on baseline requirement of 3 liters. [id number] [address] Bishop for GOOM from a medical standpoint. Will sign off. Please call w/ questions.     Hilary Chandler MD

## 2022-12-15 NOTE — DISCHARGE SUMMARY
[facility]  Discharge Summary    Date:  12/15/2022    Name:  Leah Tristan  Address:  [address] [place] [address]    :  1945      Age:   68 y.o. Medical Record Number:  [id number]  Admit Date: 2022  Date of Discharge:     PROCEDURE:   (Right) Reverse total shoulder arthroplasty     REASON FOR ADMISSION:    Postoperative pain control, vital sign monitoring, physical therapy, and IV fluid resuscitation.     Current Medications:     sodium chloride flush  5-40 mL IntraVENous 2 times per day    acetaminophen  650 mg Oral Q6H    aspirin  325 mg Oral BID    doxycycline monohydrate  50 mg Oral 2 times per day    amLODIPine  2.5 mg Oral Daily    atorvastatin  40 mg Oral Nightly    cetirizine  10 mg Oral Daily    docusate sodium  100 mg Oral BID    doxazosin  2 mg Oral Daily    flecainide  50 mg Oral 2 times per day    hydrOXYzine HCl  25 mg Oral Nightly    levothyroxine  75 mcg Oral Daily    metoprolol succinate  50 mg Oral BID    mirtazapine  15 mg Oral Nightly    pantoprazole  40 mg Oral QAM AC    primidone  50 mg Oral 2 times per day       Review of Systems:  Unchanged from preoperative H&P     Past Medical History:  Past Medical History:   Diagnosis Date    A-fib (HCC)     Arthritis     Asthma     CAD (coronary artery disease)     Cancer (HCC)     CHF (congestive heart failure) (HCC)     COPD (chronic obstructive pulmonary disease) (HCC)     CPAP     sleep apnea    Depression     Hyperlipidemia     Hypertension     Paralysis of diaphragm     partial right    Thyroid disease        Past Surgical History:  Past Surgical History:   Procedure Laterality Date    ANKLE SURGERY Right     5 screws    CHOLECYSTECTOMY, LAPAROSCOPIC      HYSTERECTOMY, TOTAL ABDOMINAL (CERVIX REMOVED)      later cyst removed from scar    MASTECTOMY Right     with lymph node dissection times 5    SHOULDER SURGERY Right 2022    RIGHT REVERSE TOTAL SHOULDER REPLACEMENT WITH OPEN BICEP TENODESIS performed by Vasquez Reis MD at [place] [address]         Medications:  No current facility-administered medications on file prior to encounter. Current Outpatient Medications on File Prior to Encounter   Medication Sig Dispense Refill    cetirizine (ZYRTEC) 10 MG tablet Take 10 mg by mouth daily      docusate sodium (COLACE) 100 MG capsule Take 100 mg by mouth 2 times daily      albuterol sulfate HFA (PROVENTIL;VENTOLIN;PROAIR) 108 (90 Base) MCG/ACT inhaler 2 puffs as needed      amLODIPine (NORVASC) 2.5 MG tablet TAKE 1 TABLET BY MOUTH ONCE DAILY      atorvastatin (LIPITOR) 40 MG tablet Take 40 mg by mouth nightly      bisacodyl (DULCOLAX) 5 MG EC tablet Take 5 mg by mouth      Calcium Polycarbophil (FIBER) 625 MG TABS Take 625 mg by mouth daily      doxazosin (CARDURA) 2 MG tablet Take 2 mg by mouth daily      ferrous sulfate (IRON 325) 325 (65 Fe) MG tablet Take 325 mg by mouth 2 times daily (with meals)      flecainide (TAMBOCOR) 50 MG tablet Take 50 mg by mouth every 12 hours      Fluticasone-Umeclidin-Vilant (TRELEGY ELLIPTA) 200-62.5-25 MCG/INH AEPB Inhale 1 puff into the lungs daily      hydrOXYzine HCl (ATARAX) 25 MG tablet Take 25 mg by mouth nightly      levothyroxine (SYNTHROID) 75 MCG tablet Take 75 mcg by mouth daily      metoprolol succinate (TOPROL XL) 50 MG extended release tablet Take 50 mg by mouth 2 times daily      mirtazapine (REMERON) 15 MG tablet Take 15 mg by mouth nightly      pantoprazole (PROTONIX) 40 MG tablet       pregabalin (LYRICA) 25 MG capsule Take 25 mg by mouth 3 times daily. primidone (MYSOLINE) 50 MG tablet Take 50 mg by mouth every 12 hours         Allergies:   Allergies   Allergen Reactions    Ibuprofen Hives    Meperidine Hives    Meperidine Hcl      Other reaction(s): Unknown    Olanzapine      Other reaction(s): \"shakiness\"    Furosemide Swelling     Other reaction(s): Unknown  Pt states that she cannot take generic form (furosemide)  Face and throat swelling      Lidocaine Rash     Other reaction(s): Unknown  Other reaction(s): Unknown    Povidone-Iodine Rash     TOPICAL IODINE       Social History:  Social History     Socioeconomic History    Marital status:      Spouse name: Not on file    Number of children: Not on file    Years of education: Not on file    Highest education level: Not on file   Occupational History    Not on file   Tobacco Use    Smoking status: Never    Smokeless tobacco: Never   Vaping Use    Vaping Use: Never used   Substance and Sexual Activity    Alcohol use: Not Currently    Drug use: Not on file    Sexual activity: Not on file   Other Topics Concern    Not on file   Social History Narrative    Not on file     Social Determinants of Health     Financial Resource Strain: Not on file   Food Insecurity: Not on file   Transportation Needs: Not on file   Physical Activity: Not on file   Stress: Not on file   Social Connections: Not on file   Intimate Partner Violence: Not on file   Housing Stability: Not on file       Family History:  History reviewed. No pertinent family history. Vitals:    12/15/22 0730   BP: 134/60   Pulse: 80   Resp: 18   Temp: 98 °F (36.7 °C)   SpO2: 96%     General: No acute distress. Alert and oriented. Neuro: alert. oriented  Eyes: Extra-ocular muscles intact  Mouth: Oral mucosa moist. No perioral lesions  Pulm: Respirations unlabored and regular. Heart: Regular rate and rhythm   Skin: warm, well perfused  MS:  Extremity pain, swelling, and limited ROM. DISCHARGE DIAGNOSES:  Problem List Items Addressed This Visit    None  Visit Diagnoses       Rotator cuff arthropathy of right shoulder    -  Primary    Relevant Medications    oxyCODONE-acetaminophen (PERCOCET) 7.5-325 MG per tablet            HOSPITAL COURSE:  Chris Martinez is a 68 y.o. patient who was admitted to the hospital on the day of surgery. Surgery went as planned.   After surgery, the patient was admitted to the Med/Surg unit for postoperative care. Postoperative course was uneventful. The patient tolerated a regular diet and had good pain control on oral pain medication. Discharged occurred on  and they were discharged home in stable condition. DISCHARGE INSTRUCTIONS:  --  Non weightbearing affected shoulder    --  May take shower after the 4th post operative day unless otherwise specified by your physician. Do NOT soak the incision. Pat the incision dry with clean towel. Keep incision clean and dry. --  If you have a glued on adhesive dressing do NOT remove this. This will fall off on its own in 2-3 weeks. --  Resume pre-op diet. --  Total joint precautions. --  Take the medicines prescribed for pain. --  ASA for DVT prophylaxis. --  Resume home medications per PCP. --  Follow up with orthopaedic surgeon as scheduled   --  Follow up with primary care physician within 6-8 weeks. --  For any questions or concerns, call [phone] and Orthopaedic office or go to the nearest emergency department.       Federico Quintana MD [facility]    Orthopaedic Surgeon, Clinical Fellow  [address] [facility]   On behalf of Dr Valerie Estrada      Date:  12/15/2022

## 2022-12-15 NOTE — CARE COORDINATION
CM following: CM met with pt at bedside and updated that [facility] [address] is able to accept and has started precert. CM answered questions and pt expressed understanding. AGUSTO also spoke with Roxann Watson, who reported already speaking with [facility] and knowing that precert was started and is still pending. CM will continue to follow for discharge planning.   Electronically signed by ROSITA Mcnair on 12/15/2022 at 2:48 PM  [phone]

## 2022-12-16 PROCEDURE — 97530 THERAPEUTIC ACTIVITIES: CPT

## 2022-12-16 PROCEDURE — 6370000000 HC RX 637 (ALT 250 FOR IP): Performed by: INTERNAL MEDICINE

## 2022-12-16 PROCEDURE — 2700000000 HC OXYGEN THERAPY PER DAY

## 2022-12-16 PROCEDURE — 2580000003 HC RX 258

## 2022-12-16 PROCEDURE — 6370000000 HC RX 637 (ALT 250 FOR IP)

## 2022-12-16 PROCEDURE — 94664 DEMO&/EVAL PT USE INHALER: CPT

## 2022-12-16 PROCEDURE — 94761 N-INVAS EAR/PLS OXIMETRY MLT: CPT

## 2022-12-16 PROCEDURE — 1200000000 HC SEMI PRIVATE

## 2022-12-16 PROCEDURE — 97116 GAIT TRAINING THERAPY: CPT

## 2022-12-16 PROCEDURE — 94150 VITAL CAPACITY TEST: CPT

## 2022-12-16 PROCEDURE — 94660 CPAP INITIATION&MGMT: CPT

## 2022-12-16 PROCEDURE — 97535 SELF CARE MNGMENT TRAINING: CPT

## 2022-12-16 RX ADMIN — PRIMIDONE 50 MG: 50 TABLET ORAL at 09:26

## 2022-12-16 RX ADMIN — HYDROXYZINE HYDROCHLORIDE 25 MG: 25 TABLET ORAL at 20:23

## 2022-12-16 RX ADMIN — ACETAMINOPHEN 650 MG: 325 TABLET, FILM COATED ORAL at 20:23

## 2022-12-16 RX ADMIN — PREGABALIN 25 MG: 25 CAPSULE ORAL at 14:23

## 2022-12-16 RX ADMIN — DOXAZOSIN 2 MG: 2 TABLET ORAL at 09:26

## 2022-12-16 RX ADMIN — PRIMIDONE 50 MG: 50 TABLET ORAL at 20:23

## 2022-12-16 RX ADMIN — SODIUM CHLORIDE, PRESERVATIVE FREE 10 ML: 5 INJECTION INTRAVENOUS at 20:23

## 2022-12-16 RX ADMIN — DOXYCYCLINE 50 MG: 50 CAPSULE ORAL at 09:27

## 2022-12-16 RX ADMIN — OXYCODONE 10 MG: 5 TABLET ORAL at 08:16

## 2022-12-16 RX ADMIN — FLECAINIDE ACETATE 50 MG: 50 TABLET ORAL at 20:28

## 2022-12-16 RX ADMIN — SODIUM CHLORIDE, PRESERVATIVE FREE 10 ML: 5 INJECTION INTRAVENOUS at 09:28

## 2022-12-16 RX ADMIN — ACETAMINOPHEN 650 MG: 325 TABLET, FILM COATED ORAL at 18:06

## 2022-12-16 RX ADMIN — CETIRIZINE HYDROCHLORIDE 10 MG: 10 TABLET, FILM COATED ORAL at 09:25

## 2022-12-16 RX ADMIN — MIRTAZAPINE 15 MG: 15 TABLET, FILM COATED ORAL at 20:23

## 2022-12-16 RX ADMIN — ACETAMINOPHEN 650 MG: 325 TABLET, FILM COATED ORAL at 09:26

## 2022-12-16 RX ADMIN — ASPIRIN 325 MG: 325 TABLET, COATED ORAL at 20:23

## 2022-12-16 RX ADMIN — ASPIRIN 325 MG: 325 TABLET, COATED ORAL at 09:26

## 2022-12-16 RX ADMIN — PANTOPRAZOLE SODIUM 40 MG: 40 TABLET, DELAYED RELEASE ORAL at 06:33

## 2022-12-16 RX ADMIN — PREGABALIN 25 MG: 25 CAPSULE ORAL at 09:26

## 2022-12-16 RX ADMIN — OXYCODONE 10 MG: 5 TABLET ORAL at 14:23

## 2022-12-16 RX ADMIN — DOXYCYCLINE 50 MG: 50 CAPSULE ORAL at 20:23

## 2022-12-16 RX ADMIN — FLECAINIDE ACETATE 50 MG: 50 TABLET ORAL at 09:27

## 2022-12-16 RX ADMIN — PREGABALIN 25 MG: 25 CAPSULE ORAL at 20:23

## 2022-12-16 RX ADMIN — DOCUSATE SODIUM 100 MG: 100 CAPSULE, LIQUID FILLED ORAL at 09:26

## 2022-12-16 RX ADMIN — DOCUSATE SODIUM 100 MG: 100 CAPSULE, LIQUID FILLED ORAL at 20:23

## 2022-12-16 RX ADMIN — METOPROLOL SUCCINATE 50 MG: 50 TABLET, EXTENDED RELEASE ORAL at 09:26

## 2022-12-16 RX ADMIN — ATORVASTATIN CALCIUM 40 MG: 40 TABLET, FILM COATED ORAL at 20:23

## 2022-12-16 RX ADMIN — OXYCODONE 10 MG: 5 TABLET ORAL at 19:26

## 2022-12-16 RX ADMIN — LEVOTHYROXINE SODIUM 75 MCG: 0.07 TABLET ORAL at 06:33

## 2022-12-16 RX ADMIN — METOPROLOL SUCCINATE 50 MG: 50 TABLET, EXTENDED RELEASE ORAL at 20:23

## 2022-12-16 RX ADMIN — AMLODIPINE BESYLATE 2.5 MG: 2.5 TABLET ORAL at 09:26

## 2022-12-16 ASSESSMENT — PAIN DESCRIPTION - LOCATION
LOCATION: SHOULDER

## 2022-12-16 ASSESSMENT — PAIN DESCRIPTION - ORIENTATION
ORIENTATION: RIGHT

## 2022-12-16 ASSESSMENT — PAIN DESCRIPTION - DESCRIPTORS
DESCRIPTORS: ACHING

## 2022-12-16 ASSESSMENT — PAIN DESCRIPTION - PAIN TYPE: TYPE: SURGICAL PAIN

## 2022-12-16 ASSESSMENT — PAIN - FUNCTIONAL ASSESSMENT: PAIN_FUNCTIONAL_ASSESSMENT: PREVENTS OR INTERFERES SOME ACTIVE ACTIVITIES AND ADLS

## 2022-12-16 ASSESSMENT — PAIN DESCRIPTION - FREQUENCY: FREQUENCY: CONTINUOUS

## 2022-12-16 ASSESSMENT — PAIN SCALES - GENERAL
PAINLEVEL_OUTOF10: 2
PAINLEVEL_OUTOF10: 4
PAINLEVEL_OUTOF10: 8
PAINLEVEL_OUTOF10: 2
PAINLEVEL_OUTOF10: 8
PAINLEVEL_OUTOF10: 0
PAINLEVEL_OUTOF10: 9
PAINLEVEL_OUTOF10: 8

## 2022-12-16 ASSESSMENT — PAIN DESCRIPTION - ONSET: ONSET: ON-GOING

## 2022-12-16 NOTE — PROGRESS NOTES
Occupational Therapy  Daily Treatment Note  Patient Name: Manuela Castellanos  MRN: [id number]    Chart Reviewed: Yes       Other position/activity restrictions: NWB R UE, shoulder immobilizer   Discharge Recommendations: Manuela Castellanso scored a 13/24 on the AM-PAC ADL Inpatient form. Current research shows that an AM-PAC score of 17 or less is typically not associated with a discharge to the patient's home setting. Based on the patient's AM-PAC score and their current ADL deficits, it is recommended that the patient have 3-5 sessions per week of Occupational Therapy at d/c to increase the patient's independence. Please see assessment section for further patient specific details. If patient discharges prior to next session this note will serve as a discharge summary. Please see below for the latest assessment towards goals. Additional Pertinent Hx: Pt admitted 12/13 with R shoulder pain s/p reverse total shoulder replacement. PMH:  asthma, COPD, HTN, OA, CAD, CHF, depression, cancer, Afib      Diagnosis: s/p R TSA  Treatment Diagnosis: impaired ADLs / functional mobility 2/2 R TSA / R shoulder immobilizer    Subjective: Pt semi supine in bed upon arrival, agreeable to OT alexander. Pain: unrated R shoulder pain      Objective:    Cognition/Orientation:  WFL    Bed mobility   Supine to sit: min A    Functional Mobility   Sit to Stand: CGA  Stand to Sit: CGA+ cueing for hand placement  Bed to Chair Transfer: around bed with HHA with min A    ADLs   Grooming: min A to brush R side of hair, setup to wash face. LB dressing: Assist to thread LEs into brief, pulling up on R side. Pt reporting at home she was getting assistance for bathing every other day, and when aides were not there pt reporting she was not getting dressed/ undressed. Toileting: rosendo, pt denied urge to void    Activity Tolerance: Mod fatigue reported after mobility in room around bed.  O2 sats in mid 90s     Patient Education:   Educated pt on benefits of activity promotion, progress towards goals. Safety Devices in Place:  CA activated, call light in reach and all needs met    Assessment:   Pt demonstrated improved sit to stand transfers, progressing with functional mobility. Pt requiring min A for sit to stand transfers and CGA to stand from bedside chair. Mobility with handheld assist. Pt completed LB dressing with mod A to don brief. Pt would benefit from continued OT while in acute care, continue OT POC. Equipment Needs:  defer    Timed Code Treatment Minutes: Individual Concurrent Group Co-treatment   Time In 1059         Time Out 1137         Minutes 38           Total Treatment Time: 38 mins total    Goals:  Short Term Goals  Time Frame for Short Term Goals: at d/c  Short Term Goal 1: Stance x 2 mins with CGA for ADLs / toileting assist - not met  Short Term Goal 2: Functional transfer with CGA and LRAD prn  - not met  Short Term Goal 3: Grooming with setup  - not met  Short Term Goal 4: Pt/ family demo donning / doffing Shoulder immobilizer with CGA  - not met         Plan:      Times Per Week: 7x   Times Per Day: Once a day    If patient is discharged prior to next treatment, this note will serve as the discharge summary. Margaret Holland.  [facility] [facility], OTR/L [id number]

## 2022-12-16 NOTE — PROGRESS NOTES
Patient A& O x 4. Changed  xeroform , abd pad and gauze with paper tape on right axillary . Patient use bed side commode X 1, stand and pivot. Pain is managed Per MAR. Immobilizer in place. All fall precaution in place. Will continue to monitor.

## 2022-12-16 NOTE — CARE COORDINATION
CM following: CM spoke with Anna Blanco at [facility] [phone] (phone) [phone] (fax). Anna Blanco states the insurance has denied and the pt and family requested an expedited appeal, which Anna Blanco started today. Anna Blanco was provided with weekend CM coverage contact numbers should they get approval over the weekend. Weekend coverage at [facility] on Saturday is Abebe Primrose [phone] and Sunday Anne Marierena Eng. CM will continue to follow for discharge planning.   Electronically signed by ROSITA Grubbs on 12/16/2022 at 3:48 PM  [phone]

## 2022-12-16 NOTE — DISCHARGE INSTR - COC
12Continuity of Care Form    Patient Name: Manuela Castellanos   :  1945  MRN:  [id number]    Admit date:  2022  Discharge date:  22    Code Status Order: Full Code   Advance Directives:   [address] [facility] Documentation       Date/Time Healthcare Directive Type of Healthcare Directive Copy in [address] Agent's Name Healthcare Agent's Phone Number    [phone] Yes, patient has an advance directive for healthcare treatment Durable power of  for health care; Health care treatment directive; Living will Yes, copy in chart Adult Children daughter in law Wilma Sharif [phone]            Admitting Physician:  Tayo Arroyo MD  PCP: Tomi Mckeon DO    Discharging Nurse: Jesu Kohler [id number] Unit/Room#: 7104/9374-44  Discharging Unit Phone Number: [phone]    Emergency Contact:   Extended Emergency Contact Information  Primary Emergency Contact: [address] [place] [address]  Mobile Phone: [phone]  Relation: Daughter-in-Law    Past Surgical History:  Past Surgical History:   Procedure Laterality Date    ANKLE SURGERY Right     5 screws    CHOLECYSTECTOMY, LAPAROSCOPIC      HYSTERECTOMY, TOTAL ABDOMINAL (CERVIX REMOVED)      later cyst removed from scar    MASTECTOMY Right     with lymph node dissection times 5    SHOULDER SURGERY Right 2022    RIGHT REVERSE TOTAL SHOULDER REPLACEMENT WITH OPEN BICEP TENODESIS performed by Tayo Arroyo MD at [address] [place] [address]         Immunization History:   Immunization History   Administered Date(s) Administered    COVID-19, PFIZER Bivalent BOOSTER, (age 12y+), IM, 30 mcg/0.3 mL dose 2022    COVID-19, PFIZER GRAY top, DO NOT Dilute, (age 15 y+), IM, 30 mcg/0.3 mL 2022    COVID-19, PFIZER PURPLE top, DILUTE for use, (age 15 y+), 30mcg/0.3mL 2021, 2021       Active Problems:  Patient Active Problem List   Diagnosis Code    Acute on chronic combined systolic and diastolic congestive heart failure ([facility]) I50.43    Age-related osteoporosis without current pathological fracture M81.0    Bilateral edema of lower extremity R60.0    Chronic respiratory failure with hypoxia and hypercapnia ([facility]) J96.11, J96.12    COPD (chronic obstructive pulmonary disease) ([facility]) J44.9    S/P reverse total shoulder arthroplasty, right Z96.611       Isolation/Infection:   Isolation            No Isolation          Patient Infection Status       None to display            Nurse Assessment:  Last Vital Signs: /77   Pulse 73   Temp 98 °F (36.7 °C) (Oral)   Resp 18   Ht 5' 5\" (1.651 m)   Wt 186 lb 12.8 oz (84.7 kg)   SpO2 92%   BMI 31.09 kg/m²     Last documented pain score (0-10 scale): Pain Level: 8  Last Weight:   Wt Readings from Last 1 Encounters:   12/16/22 186 lb 12.8 oz (84.7 kg)     Mental Status:  oriented and alert    IV Access:  - None    Nursing Mobility/ADLs:  Walking   Assisted   Transfer  Assisted  Bathing  Assisted  Dressing  Assisted  Toileting  Assisted  Feeding  Independent  Med Admin  Independent  Med Delivery   whole    Wound Care Documentation and Therapy:  Incision 12/13/22 Shoulder Right (Active)   Dressing Status Clean;Dry; Intact 12/16/22 1101   Dressing/Treatment Dry dressing;Ice applied/Cryotherapy 12/16/22 1101   Drainage Amount None 12/16/22 1101   Odor None 12/16/22 1101   Number of days: 3        Elimination:  Continence: Bowel: Yes  Bladder: No  Urinary Catheter: None   Colostomy/Ileostomy/Ileal Conduit: No       Date of Last BM: 12/17/22    Intake/Output Summary (Last 24 hours) at 12/16/2022 1500  Last data filed at 12/16/2022 0817  Gross per 24 hour   Intake 720 ml   Output --   Net 720 ml     I/O last 3 completed shifts: In: 1200 [P.O.:1200]  Out: -     Safety Concerns:      At Risk for Falls    Impairments/Disabilities:      None    Nutrition Therapy:  Current Nutrition Therapy:   - Oral Diet:  Low Fat, Low Sodium (2gm), and Low Cholesterol , High Fiber     Routes of Feeding: Oral  Liquids: No Restrictions  Daily Fluid Restriction: no  Last Modified Barium Swallow with Video (Video Swallowing Test): not done    Treatments at the Time of Hospital Discharge:   Respiratory Treatments: Albuterol Nebulizer, CPAP  Oxygen Therapy:  is on oxygen at 2 L/min per nasal cannula. Ventilator:    - No ventilator support    Heart Failure Instructions for Daily Management  Patient was treated for chronic diastolic heart failure. she  will require the following:    Please weigh daily on the same scale and approximately the same time of day. Report weight gain of 3 pounds/day or 5 pounds/week to : [address] [facility] Cardiology / Dr Jovanna Singh, [facility] MD, and Tomi Mckeon [place] [phone]. Please use hospital discharge weight as baseline reference. Please monitor for signs and symptoms of and report to MD:  Worsening Heart Failure: sudden weight gain, shortness of breath, lower extremity or general edema/swelling, abdominal bloating/swelling, inability to lie flat, intolerance to usual activity, or cough (especially at night). Report these finding even if no increase in weight. Dehydration:  having difficulty or a decrease in urination, dizziness, worsening fatigue, or new onset/worsening of generalized weakness. Please continue a LOW SODIUM diet and LIMIT fluid intake to 48 - 64 ounces ( 1.5 - 2 liters) per day. Call  [address] [facility] Cardiology / Dr Jovanna Singh, [facility] [phone], and joanna BERG with any questions or concerns. Please continue heart failure education to patient and family/support system. See After Visit Summary for hospital follow up appointment details. Consider spiritual care referral for support and/or completion of advance directives .   Consider: having the facility MD complete required 7 day follow up, [place] [address] telehealth program if patient agreeable and able to participate, and palliative care consult for ongoing goals of care, end of life, and/or chronic disease management discussions. Patient's primary cardiologist is Dr Charmian Leventhal. Rehab Therapies: Physical Therapy and Occupational Therapy  Weight Bearing Status/Restrictions: NWB on right arm   Other Medical Equipment (for information only, NOT a DME order):  stedy  Other Treatments: CPAP    Patient's personal belongings (please select all that are sent with patient):  Glasses, Dentures, Suitcase, portable oxygen tank, a few bags with belongings     RN SIGNATURE:  Electronically signed by Ron Santacruz RN on 12/19/22 at 5:53 PM EST    CASE MANAGEMENT/SOCIAL WORK SECTION    Inpatient Status Date: ***    Readmission Risk Assessment Score:  Readmission Risk              Risk of Unplanned Readmission:  11           Discharging to Facility/ Agency   Name: [facility] P[facility]    Phone: [phone]        / signature: Electronically signed by Karina Butts RN on 12/19/22 at 4:28 PM EST    PHYSICIAN SECTION    Prognosis: Good    Condition at Discharge: Stable    Rehab Potential (if transferring to Rehab): Good    Recommended Labs or Other Treatments After Discharge:     Right Shoulder Exam:  Sling is in place. Dressing is clean, dry, and intact. Compartments are soft and compressible in the arm and forearm. Motor intact with wrist and thumb extension, finger and wrist flexion, finger abduction, and pinch. Sensation intact in the axillary, radial, median and ulnar nerves. 2/2 Radial  pulse, handwarm with brisk capillary refill < 2 seconds. Superficial skin blistering in axillary region, away from incision site, small amount of sloughing, not concerned for infection      Assessment:  1). S/p right Reverse total shoulder arthroplasty, post-operative day 5     Treatment Plan:    1). Maintain dressing, reinforce as needed per nursing  2). Pain control PRN  3). DVT ppx  4). Ice PRN  5). Encourage diet  6). GI ppx  7).  PT/OT, increase activity as tolerated per protocol, NWB and Sling Full Time  8). Nutritional support  9) follow up OP with Dr Ludwin Nguyen in 7-14 days  Physician Certification: I certify the above information and transfer of Claudio Collins  is necessary for the continuing treatment of the diagnosis listed and that she requires Acute Rehab for less 30 days.      Update Admission H&P: No change in H&P    PHYSICIAN SIGNATURE:  Husam Whelan MD

## 2022-12-16 NOTE — PROGRESS NOTES
Set up patient's home CPAP unit, pt states the mask she brought is uncomfortable so she will have a family member bring in her other mask from home for tomorrow night. On 2L nasal cannula for tonight. SpO2 currently 95%.

## 2022-12-16 NOTE — PLAN OF CARE
Problem: Chronic Conditions and Co-morbidities  Goal: Patient's chronic conditions and co-morbidity symptoms are monitored and maintained or improved  12/16/2022 1128 by Christina Hardy RN  Outcome: Progressing  12/16/2022 0800 by Adolfo Adan RN  Outcome: Progressing     Problem: Discharge Planning  Goal: Discharge to home or other facility with appropriate resources  12/16/2022 1128 by Christina Hardy RN  Outcome: Progressing  12/16/2022 0800 by Adolfo Adan RN  Outcome: Progressing     Problem: Pain  Goal: Verbalizes/displays adequate comfort level or baseline comfort level  12/16/2022 1128 by Christina Hardy RN  Outcome: Progressing  12/16/2022 0800 by Adolfo Adan RN  Outcome: Progressing     Problem: Respiratory - Adult  Goal: Achieves optimal ventilation and oxygenation  12/16/2022 1128 by Christina Hardy RN  Outcome: Progressing  12/16/2022 0800 by Adolfo Adan RN  Outcome: Progressing     Problem: Skin/Tissue Integrity  Goal: Absence of new skin breakdown  Description: 1. Monitor for areas of redness and/or skin breakdown  2. Assess vascular access sites hourly  3. Every 4-6 hours minimum:  Change oxygen saturation probe site  4. Every 4-6 hours:  If on nasal continuous positive airway pressure, respiratory therapy assess nares and determine need for appliance change or resting period.   12/16/2022 1128 by Christina Hardy RN  Outcome: Progressing  12/16/2022 0800 by Adolfo Adan RN  Outcome: Progressing     Problem: ABCDS Injury Assessment  Goal: Absence of physical injury  12/16/2022 1128 by Christina Hadry RN  Outcome: Progressing  Flowsheets (Taken 12/16/2022 1125)  Absence of Physical Injury: Implement safety measures based on patient assessment  12/16/2022 0800 by Adolfo Adan RN  Outcome: Progressing     Problem: Safety - Adult  Goal: Free from fall injury  12/16/2022 1128 by Christina Hardy RN  Outcome: Progressing  Flowsheets (Taken 12/16/2022 1125)  Free From Fall Injury:   Bandar Perez family/caregiver on patient safety   Based on caregiver fall risk screen, instruct family/caregiver to ask for assistance with transferring infant if caregiver noted to have fall risk factors  12/16/2022 0800 by Matthew Cantu RN  Outcome: Progressing

## 2022-12-16 NOTE — PROGRESS NOTES
Physical Therapy  [facility]  Physical Therapy Daily Treatment Note    Name: Dedrick Ash  : 1945  MRN: [id number]  Date of Service: 2022    Discharge Recommendations:    Dedrick Ash scored a 16/24 on the AM-PAC short mobility form. Current research shows that an AM-PAC score of 17 or less is typically not associated with a discharge to the patient's home setting. Based on the patient's AM-PAC score and their current functional mobility deficits, it is recommended that the patient have 3-5 sessions per week of Physical Therapy at d/c to increase the patient's independence. Please see assessment section for further patient specific details. PT Equipment Recommendations  Equipment Needed: No (defer to next level of care)      Patient Diagnosis(es): The encounter diagnosis was Rotator cuff arthropathy of right shoulder. Past Medical History:  has a past medical history of A-fib ([facility] Arthritis, Asthma, CAD (coronary artery disease), Cancer ([facility] CHF (congestive heart failure) ([address] COPD (chronic obstructive pulmonary disease) ([facility] CPAP, Depression, Hyperlipidemia, Hypertension, Paralysis of diaphragm, and Thyroid disease. Past Surgical History:  has a past surgical history that includes Hysterectomy, total abdominal; Ankle surgery (Right); Mastectomy (Right); Tonsillectomy; Cholecystectomy, laparoscopic; and shoulder surgery (Right, 2022). Assessment   Assessment: Patient able to increase ambulation distance during today's session and requires overall decreased assistance with bed mobility, transfers and ambulation. She demonstrates dyspnea on exertion; O2 saturation 93% following ambulation on 2L via nasal canula. Patient requires increased time to complete tasks as movements are slow and effortful. Patient typically uses upright walker at home, does report independence with ADLs but also reports not getting dressed on the days when aide is not present. Patient continues to function below baseline level. Recommend skilled PT upon discharge. Will follow while in acute care setting to maximize independence with mobility. Treatment Diagnosis: Decreased gait associated with R reverse total shoulder. Therapy Prognosis: Good  Requires PT Follow-Up: Yes  Activity Tolerance  Activity Tolerance: Patient limited by fatigue;Patient limited by endurance; Patient limited by pain     Plan   Physcial Therapy Plan  General Plan: 1 time a day 7 days a week  Current Treatment Recommendations: Transfer training, Functional mobility training, Strengthening, Gait training  Safety Devices  Type of Devices: Left in chair, Chair alarm in place, Call light within reach, Nurse notified, Gait belt     Restrictions  Position Activity Restriction  Other position/activity restrictions: NWB R UE, shoulder immobilizer     Subjective   Pain: reports surgical pain and chronic pain in lower back/hips with mobility; not rated, RN aware and patient repositioned for comfort at end of session  General  Chart Reviewed: Yes  Patient assessed for rehabilitation services?: Yes  Additional Pertinent Hx: Pt admitted 12/13 with R shoulder pain s/p reverse total shoulder replacement. PMH:  asthma, COPD, HTN, OA, CAD, CHF, depression, cancer, Afib  Family / Caregiver Present: No  Diagnosis: R shoulder pain  Follows Commands: Within Functional Limits  General Comment  Comments: Patient supine in bed upon arrival.  Subjective  Subjective: Patient agreeable to PT treatment. Cognition   Orientation  Overall Orientation Status: Within Functional Limits  Cognition  Overall Cognitive Status: WFL     Objective                           Bed mobility  Supine to Sit: Minimal assistance (to left, head of bed elevated, assist to bring trunk to upright sitting position)  Scooting:  Moderate assistance (to EOB)  Bed Mobility Comments: patient sitting up in bedside chair at end of session  Transfers  Sit to Stand: Minimal Assistance;Contact guard assistance (min assist from EOB, CGA from bedside chair)  Stand to Sit: Contact guard assistance  Ambulation  Surface: Level tile  Device: Hand-Held Assist  Other Apparatus: O2 (R shoulder immobilizer; 2L)  Assistance: Minimal assistance  Quality of Gait: gait mildly unsteady though no overt loss of balance noted, wide base of support, significantly decreased step length/height bilaterally with shuffling steps  Distance: 15' around bed to bedside chair  Comments: dyspnea with exertion  More Ambulation?: No  Stairs/Curb  Stairs?: No     Balance  Sitting - Static: Fair;+ (SBA)  Standing - Static: Fair (CGA with UE support on L)  Standing - Dynamic: Fair;- (min assist to ambulate with HHA on L)           OutComes Score                                                  AM-PAC Score  AM-PAC Inpatient Mobility Raw Score : 16 (12/16/22 1256)  AM-PAC Inpatient T-Scale Score : 40.78 (12/16/22 1256)  Mobility Inpatient CMS 0-100% Score: 54.16 (12/16/22 1256)  Mobility Inpatient CMS G-Code Modifier : CK (12/16/22 1256)          Tinneti Score       Goals  Short Term Goals  Time Frame for Short Term Goals: Discharge - all ongoing 12/16  Short Term Goal 1: supine <> sit SBA  Short Term Goal 2: sit <> stand Min A (met 12/15)  New goal:  sit <> stand SBA  Short Term Goal 3: bed <> chair Mod A x2 (met 12/15)  New goal:  bed <> chair SBA  Short Term Goal 4: ambulate 30ft with LRAD CGA  Patient Goals   Patient Goals : Return home       Education  Patient Education  Education Given To: Patient  Education Provided: Role of Therapy;Plan of Care;Transfer Training;Precautions  Education Method: Verbal  Barriers to Learning: None  Education Outcome: Verbalized understanding      Therapy Time   Individual Concurrent Group Co-treatment   Time In 1059         Time Out 1137         Minutes 38         Timed Code Treatment Minutes: 38 Minutes     If patient discharges prior to next session this note will serve as a discharge summary. Please see below for the latest assessment towards goals.     Jordan MALONE [facility].

## 2022-12-16 NOTE — PROGRESS NOTES
Pt A&Ox4, VSS on 3 L of O2. Daily weight taken. She is in regular; low fat/low cholesterol /high fiber and 2 gm sodium diet. Pt endorses continuous shoulder pain 8/10, treated with PRNs per MAR. Pt able to move all fingers, has a moderate grasp, and full sensation. No acute changes this shift. Voiding adequately via pure wick. Tolerating PO diet and fluids. Denies further needs at this time. Call light within reach. Standard safety measures in place. Will continue to monitor.

## 2022-12-16 NOTE — PROGRESS NOTES
Orthopaedic Surgery Fellow Post Operative Right Reverse Total Shoulder Rounding Progress Note    History of Present Illness:  Russell Taveras is a 68 y.o. female patient who was seen this morning. There were no events overnight. Pain is controlled. They currently deny any dizziness, fevers, chills, shortness of breath, chest pain, paresthesias, or any other current complaints. Medical History:  Patient's medications, allergies, past medical, surgical, social and family histories were reviewed and updated as appropriate. They are as noted. Social History     Socioeconomic History    Marital status:      Spouse name: Not on file    Number of children: Not on file    Years of education: Not on file    Highest education level: Not on file   Occupational History    Not on file   Tobacco Use    Smoking status: Never    Smokeless tobacco: Never   Vaping Use    Vaping Use: Never used   Substance and Sexual Activity    Alcohol use: Not Currently    Drug use: Not on file    Sexual activity: Not on file   Other Topics Concern    Not on file   Social History Narrative    Not on file     Social Determinants of Health     Financial Resource Strain: Not on file   Food Insecurity: Not on file   Transportation Needs: Not on file   Physical Activity: Not on file   Stress: Not on file   Social Connections: Not on file   Intimate Partner Violence: Not on file   Housing Stability: Not on file       Allergies   Allergen Reactions    Ibuprofen Hives    Meperidine Hives    Meperidine Hcl      Other reaction(s): Unknown    Olanzapine      Other reaction(s): \"shakiness\"    Furosemide Swelling     Other reaction(s): Unknown  Pt states that she cannot take generic form (furosemide)  Face and throat swelling      Lidocaine Rash     Other reaction(s): Unknown  Other reaction(s): Unknown    Povidone-Iodine Rash     TOPICAL IODINE     No current facility-administered medications on file prior to encounter.      Current Outpatient Medications on File Prior to Encounter   Medication Sig Dispense Refill    cetirizine (ZYRTEC) 10 MG tablet Take 10 mg by mouth daily      docusate sodium (COLACE) 100 MG capsule Take 100 mg by mouth 2 times daily      albuterol sulfate HFA (PROVENTIL;VENTOLIN;PROAIR) 108 (90 Base) MCG/ACT inhaler 2 puffs as needed      amLODIPine (NORVASC) 2.5 MG tablet TAKE 1 TABLET BY MOUTH ONCE DAILY      atorvastatin (LIPITOR) 40 MG tablet Take 40 mg by mouth nightly      bisacodyl (DULCOLAX) 5 MG EC tablet Take 5 mg by mouth      Calcium Polycarbophil (FIBER) 625 MG TABS Take 625 mg by mouth daily      doxazosin (CARDURA) 2 MG tablet Take 2 mg by mouth daily      ferrous sulfate (IRON 325) 325 (65 Fe) MG tablet Take 325 mg by mouth 2 times daily (with meals)      flecainide (TAMBOCOR) 50 MG tablet Take 50 mg by mouth every 12 hours      Fluticasone-Umeclidin-Vilant (TRELEGY ELLIPTA) 200-62.5-25 MCG/INH AEPB Inhale 1 puff into the lungs daily      hydrOXYzine HCl (ATARAX) 25 MG tablet Take 25 mg by mouth nightly      levothyroxine (SYNTHROID) 75 MCG tablet Take 75 mcg by mouth daily      metoprolol succinate (TOPROL XL) 50 MG extended release tablet Take 50 mg by mouth 2 times daily      mirtazapine (REMERON) 15 MG tablet Take 15 mg by mouth nightly      pantoprazole (PROTONIX) 40 MG tablet       pregabalin (LYRICA) 25 MG capsule Take 25 mg by mouth 3 times daily. primidone (MYSOLINE) 50 MG tablet Take 50 mg by mouth every 12 hours       family history is not on file.   Past Medical History:   Diagnosis Date    A-fib [facility])     Arthritis     Asthma     CAD (coronary artery disease)     Cancer (HCC)     CHF (congestive heart failure) (HCC)     COPD (chronic obstructive pulmonary disease) (HCC)     CPAP     sleep apnea    Depression     Hyperlipidemia     Hypertension     Paralysis of diaphragm     partial right    Thyroid disease      Past Medical History:   Diagnosis Date    A-fib ([facility] [address]     Arthritis     Asthma     CAD (coronary artery disease)     Cancer (HCC)     CHF (congestive heart failure) (HCC)     COPD (chronic obstructive pulmonary disease) (HCC)     CPAP     sleep apnea    Depression     Hyperlipidemia     Hypertension     Paralysis of diaphragm     partial right    Thyroid disease      Active Ambulatory Problems     Diagnosis Date Noted    Acute on chronic combined systolic and diastolic congestive heart failure ([facility] [address] 04/11/2017    Age-related osteoporosis without current pathological fracture 07/11/2022    Bilateral edema of lower extremity 02/29/2016    Chronic respiratory failure with hypoxia and hypercapnia ([facility] [address] 11/30/2021    COPD (chronic obstructive pulmonary disease) ([facility] 04/09/2021     Resolved Ambulatory Problems     Diagnosis Date Noted    No Resolved Ambulatory Problems     Past Medical History:   Diagnosis Date    A-fib (HCC)     Arthritis     Asthma     CAD (coronary artery disease)     Cancer (HCC)     CHF (congestive heart failure) (HCC)     CPAP     Depression     Hyperlipidemia     Hypertension     Paralysis of diaphragm     Thyroid disease        Review of Systems:  Gen: No dizziness, fevers, chills  HEENT: Negative for double vision, visual changes  CV: Negative for chest pain, palpitations  Lungs: Negative for shortness of breath or wheezing  Abdomen: Negative for abdominal pain or bloating   Neurological: Negative for numbness, paresthesias, or weakness  Psychiatric: alert and oriented to person, place and time. Urological: Negative for urinary retention    Vital Signs:  Vitals:    12/16/22 1433   BP: 114/77   Pulse: 73   Resp: 18   Temp: 98 °F (36.7 °C)   SpO2: 92%     Height: 5' 5\" (165.1 cm), Weight: 186 lb 12.8 oz (84.7 kg), Body mass index is 31.09 kg/m². ,    Physical Exam:     Appearance: Oscar Spears is alert, oriented x 3, resting comforably, no acute distress. Right Shoulder Exam:  Sling is in place. Dressing is clean, dry, and intact.  Compartments are soft and compressible in the arm and forearm. Motor intact with wrist and thumb extension, finger and wrist flexion, finger abduction, and pinch. Sensation intact in the axillary, radial, median and ulnar nerves. 2/2 Radial  pulse, handwarm with brisk capillary refill < 2 seconds. Assessment:  1). S/p right Reverse total shoulder arthroplasty, post-operative day 3    Treatment Plan:    1). Maintain dressing, reinforce as needed per nursing  2). Pain control PRN  3). DVT ppx  4). Ice PRN  5). Encourage diet  6). GI ppx  7). PT/OT, increase activity as tolerated per protocol, NWB and Sling Full Time  8). Nutritional support  9).  Hospitalist recommendations    Disposition: Anticipate discharge to rehab when bed available, hoping for today    Michael Clark MD [facility]    Orthopaedic Surgeon, Clinical Fellow  Prieto Del Castillo   On behalf of

## 2022-12-17 PROCEDURE — 97530 THERAPEUTIC ACTIVITIES: CPT

## 2022-12-17 PROCEDURE — 2580000003 HC RX 258

## 2022-12-17 PROCEDURE — 94761 N-INVAS EAR/PLS OXIMETRY MLT: CPT

## 2022-12-17 PROCEDURE — 97116 GAIT TRAINING THERAPY: CPT

## 2022-12-17 PROCEDURE — 2700000000 HC OXYGEN THERAPY PER DAY

## 2022-12-17 PROCEDURE — 6370000000 HC RX 637 (ALT 250 FOR IP): Performed by: INTERNAL MEDICINE

## 2022-12-17 PROCEDURE — 97535 SELF CARE MNGMENT TRAINING: CPT

## 2022-12-17 PROCEDURE — 6370000000 HC RX 637 (ALT 250 FOR IP)

## 2022-12-17 PROCEDURE — 1200000000 HC SEMI PRIVATE

## 2022-12-17 RX ADMIN — ACETAMINOPHEN 650 MG: 325 TABLET, FILM COATED ORAL at 22:29

## 2022-12-17 RX ADMIN — PRIMIDONE 50 MG: 50 TABLET ORAL at 22:30

## 2022-12-17 RX ADMIN — ACETAMINOPHEN 650 MG: 325 TABLET, FILM COATED ORAL at 03:19

## 2022-12-17 RX ADMIN — DOXAZOSIN 2 MG: 2 TABLET ORAL at 09:14

## 2022-12-17 RX ADMIN — OXYCODONE 5 MG: 5 TABLET ORAL at 15:34

## 2022-12-17 RX ADMIN — HYDROXYZINE HYDROCHLORIDE 25 MG: 25 TABLET ORAL at 19:39

## 2022-12-17 RX ADMIN — FLECAINIDE ACETATE 50 MG: 50 TABLET ORAL at 09:14

## 2022-12-17 RX ADMIN — ACETAMINOPHEN 650 MG: 325 TABLET, FILM COATED ORAL at 15:15

## 2022-12-17 RX ADMIN — SODIUM CHLORIDE, PRESERVATIVE FREE 10 ML: 5 INJECTION INTRAVENOUS at 09:15

## 2022-12-17 RX ADMIN — PANTOPRAZOLE SODIUM 40 MG: 40 TABLET, DELAYED RELEASE ORAL at 06:50

## 2022-12-17 RX ADMIN — SODIUM CHLORIDE, PRESERVATIVE FREE 10 ML: 5 INJECTION INTRAVENOUS at 19:40

## 2022-12-17 RX ADMIN — PREGABALIN 25 MG: 25 CAPSULE ORAL at 09:14

## 2022-12-17 RX ADMIN — OXYCODONE 10 MG: 5 TABLET ORAL at 03:19

## 2022-12-17 RX ADMIN — DOXYCYCLINE 50 MG: 50 CAPSULE ORAL at 09:14

## 2022-12-17 RX ADMIN — DOCUSATE SODIUM 100 MG: 100 CAPSULE, LIQUID FILLED ORAL at 09:14

## 2022-12-17 RX ADMIN — MIRTAZAPINE 15 MG: 15 TABLET, FILM COATED ORAL at 19:39

## 2022-12-17 RX ADMIN — DOCUSATE SODIUM 100 MG: 100 CAPSULE, LIQUID FILLED ORAL at 19:39

## 2022-12-17 RX ADMIN — METOPROLOL SUCCINATE 50 MG: 50 TABLET, EXTENDED RELEASE ORAL at 09:14

## 2022-12-17 RX ADMIN — OXYCODONE 10 MG: 5 TABLET ORAL at 09:14

## 2022-12-17 RX ADMIN — PREGABALIN 25 MG: 25 CAPSULE ORAL at 19:39

## 2022-12-17 RX ADMIN — LEVOTHYROXINE SODIUM 75 MCG: 0.07 TABLET ORAL at 06:50

## 2022-12-17 RX ADMIN — PRIMIDONE 50 MG: 50 TABLET ORAL at 09:14

## 2022-12-17 RX ADMIN — METOPROLOL SUCCINATE 50 MG: 50 TABLET, EXTENDED RELEASE ORAL at 19:39

## 2022-12-17 RX ADMIN — PREGABALIN 25 MG: 25 CAPSULE ORAL at 14:56

## 2022-12-17 RX ADMIN — ACETAMINOPHEN 650 MG: 325 TABLET, FILM COATED ORAL at 09:15

## 2022-12-17 RX ADMIN — AMLODIPINE BESYLATE 2.5 MG: 2.5 TABLET ORAL at 09:14

## 2022-12-17 RX ADMIN — ATORVASTATIN CALCIUM 40 MG: 40 TABLET, FILM COATED ORAL at 19:39

## 2022-12-17 RX ADMIN — CETIRIZINE HYDROCHLORIDE 10 MG: 10 TABLET, FILM COATED ORAL at 09:14

## 2022-12-17 RX ADMIN — OXYCODONE 10 MG: 5 TABLET ORAL at 19:39

## 2022-12-17 RX ADMIN — ASPIRIN 325 MG: 325 TABLET, COATED ORAL at 09:14

## 2022-12-17 RX ADMIN — DOXYCYCLINE 50 MG: 50 CAPSULE ORAL at 22:30

## 2022-12-17 RX ADMIN — FLECAINIDE ACETATE 50 MG: 50 TABLET ORAL at 22:30

## 2022-12-17 ASSESSMENT — PAIN DESCRIPTION - FREQUENCY
FREQUENCY: CONTINUOUS
FREQUENCY: CONTINUOUS

## 2022-12-17 ASSESSMENT — PAIN DESCRIPTION - LOCATION
LOCATION: SHOULDER

## 2022-12-17 ASSESSMENT — PAIN SCALES - GENERAL
PAINLEVEL_OUTOF10: 5
PAINLEVEL_OUTOF10: 8
PAINLEVEL_OUTOF10: 7
PAINLEVEL_OUTOF10: 5
PAINLEVEL_OUTOF10: 3
PAINLEVEL_OUTOF10: 6
PAINLEVEL_OUTOF10: 7
PAINLEVEL_OUTOF10: 7

## 2022-12-17 ASSESSMENT — PAIN DESCRIPTION - ORIENTATION
ORIENTATION: RIGHT

## 2022-12-17 ASSESSMENT — PAIN DESCRIPTION - DESCRIPTORS
DESCRIPTORS: ACHING
DESCRIPTORS: ACHING
DESCRIPTORS: ACHING;DISCOMFORT

## 2022-12-17 ASSESSMENT — PAIN DESCRIPTION - PAIN TYPE
TYPE: SURGICAL PAIN
TYPE: SURGICAL PAIN

## 2022-12-17 ASSESSMENT — PAIN - FUNCTIONAL ASSESSMENT: PAIN_FUNCTIONAL_ASSESSMENT: PREVENTS OR INTERFERES SOME ACTIVE ACTIVITIES AND ADLS

## 2022-12-17 ASSESSMENT — PAIN DESCRIPTION - ONSET
ONSET: ON-GOING
ONSET: ON-GOING

## 2022-12-17 NOTE — PROGRESS NOTES
Occupational Therapy  [facility]  Occupational Therapy Initial Assessment    Name: Jannie Pacheco  : 1945  MRN: [id number]  Date of Service: 2022    Discharge Recommendations:   Jannie Pacheco scored a 13/24 on the AM-PAC ADL Inpatient form. Current research shows that an AM-PAC score of 17 or less is typically not associated with a discharge to the patient's home setting. Based on the patient's AM-PAC score and their current ADL deficits, it is recommended that the patient have 3-5 sessions per week of Occupational Therapy at d/c to increase the patient's independence. Please see assessment section for further patient specific details. If patient discharges prior to next session this note will serve as a discharge summary. Please see below for the latest assessment towards goals. Patient Diagnosis(es): The encounter diagnosis was Rotator cuff arthropathy of right shoulder. Past Medical History:  has a past medical history of A-fib ([facility] [address] Arthritis, Asthma, CAD (coronary artery disease), Cancer ([address] CHF (congestive heart failure) ([address] COPD (chronic obstructive pulmonary disease) ([address] CPAP, Depression, Hyperlipidemia, Hypertension, Paralysis of diaphragm, and Thyroid disease. Past Surgical History:  has a past surgical history that includes Hysterectomy, total abdominal; Ankle surgery (Right); Mastectomy (Right); Tonsillectomy; Cholecystectomy, laparoscopic; and shoulder surgery (Right, 2022). Treatment Diagnosis: impaired ADLs / functional mobility 2/2 R TSA / R shoulder immobilizer      Assessment   Performance deficits / Impairments: Decreased functional mobility ; Decreased ADL status; Decreased endurance  Assessment: Pt with continued participation in therapy.  She required mod assist for supine to sit and CGA-min assist for functional mobility with hand held assist. Due to decreased endurance, Grooming ADLs completed at sitting level with assist for dental care. Total assist for toileting and max assist for LB dressing. Pt reports 8/10 pain, ice applied to shoulder at exit. Pt plans to DC to SNF. Cont OT POC. Treatment Diagnosis: impaired ADLs / functional mobility 2/2 R TSA / R shoulder immobilizer  Prognosis: Fair;Good  REQUIRES OT FOLLOW-UP: Yes  Activity Tolerance  Activity Tolerance: Patient limited by fatigue;Patient Tolerated treatment well  Activity Tolerance Comments: increased activity tolerance - remains on baseline O2 -2L. Pt demo PEREZ with mobility ? baseline        Plan   Occupational Therapy Plan  Times Per Week: 5-7  Times Per Day: Once a day  Current Treatment Recommendations: Safety education & training, Self-Care / ADL, Patient/Caregiver education & training, Endurance training, Functional mobility training     Restrictions  Position Activity Restriction  Other position/activity restrictions: NWB R UE, shoulder immobilizer    Subjective   General  Chart Reviewed: Yes  Patient assessed for rehabilitation services?: Yes  Additional Pertinent Hx: Admit 12/13  s/p R TSA                                                        PMHX: Arthritis,  Asthma, CAD, CHF, COPD w/ chronic resp failure on 2 liters of o2 by nc at baseline , Depression , Hypertension and  Thyroid disease  Family / Caregiver Present: No  Diagnosis: s/p R TSA  Subjective  Subjective: Pt asleep in bed upon arrival. Pleasant and agreeable to PT/OT.  Reports lack of sleep due to pain and environment  General Comment  Comments: .  reports surgical pain not rated, RN aware and patient repositioned for comfort at end of session  Social/Functional History  Social/Functional History  Lives With: Alone  Type of Home: Senior housing apartment  Home Layout: One level  Home Access: Level entry  Bathroom Shower/Tub: Tub/Shower unit  Bathroom Toilet: Standard (toilet safety frame)  Bathroom Equipment: Tub transfer bench, Grab bars in shower (pull cord in bathroom)  Home Equipment: Oxygen, Lift chair, Alert Button (Upright 4 wheeled walker, 2L Home O2)  Has the patient had two or more falls in the past year or any fall with injury in the past year?: No (1 fall in April- resulted in broken ribs)  ADL Assistance: Needs assistance (HHA 2x week for showers, Pt struggles with dressing)  Homemaking Assistance: Needs assistance (Daughter-in-law does laundry, cleaning and grocery shopping. Pt makes microwave meals)  Ambulation Assistance: Independent (Upright 4 weeled walker)  Transfer Assistance: Independent  Active : No  Additional Comments: Pt hoping to go to [employer] [facility]       Objective   Heart Rate: 70  Heart Rate Source: Monitor  BP: (!) 150/73  BP Location: Left upper arm  BP Method: Automatic  Patient Position: Semi fowlers  MAP (Calculated): 99  Resp: 18  SpO2: 93 %  O2 Device: Nasal cannula             Safety Devices  Type of Devices: Left in chair;Chair alarm in place;Call light within reach;Nurse notified;Gait belt  Bed Mobility Training  Bed Mobility Training: Yes  Overall Level of Assistance: Additional time  Interventions: Verbal cues  Supine to Sit: Moderate assistance (HOB elevated and use of rail)  Scooting: Moderate assistance (to EOB)  Balance  Sitting: Intact  Standing: Impaired  Transfer Training  Transfer Training: Yes  Sit to Stand: Minimum assistance (from bed, from commode, from chair x 2)  Stand to Sit: Minimum assistance  Stand Pivot Transfers: Minimum assistance  Bed to Chair: Minimum assistance  Gait Training  Gait Training: Yes  Gait  Overall Level of Assistance: Minimum assistance  Interventions: Verbal cues (for safety with mobility)  Speed/Viola: Shuffled; Slow  Step Length: Left shortened;Right shortened  Gait Abnormalities: Decreased step clearance;Shuffling gait  Distance (ft): 5 Feet (+12')  Toilet Transfers  Toilet - Technique: Stand step  Equipment Used: Standard bedside commode  Toilet Transfer: Minimal assistance  Toilet Transfers Comments:  With HHA with LUE     ADL  Grooming: Setup;Maximum assistance  Grooming Skilled Clinical Factors: Pt required setup for all grooming tasks. She washed face and combed hair without assist. Required maximum assist to brush dentures. LE Dressing: Maximum assistance  LE Dressing Skilled Clinical Factors: Donned pull up brief with assist to thread B feet and pull up from behind, pt pulled up brieft ~50% of way in front. Toileting: Dependent/Total;Maximum assistance  Toileting Skilled Clinical Factors: total assist for all parts of toileting at [facility]. Additional Comments: Pt needing increased time / effort 2/2 RUE TSA. Pt using LUE to assist with all ADLs. Activity Tolerance  Activity Tolerance: Patient limited by fatigue;Patient limited by endurance; Patient limited by pain  Bed mobility  Supine to Sit: Moderate assistance (Slow and effortful, HOB elevated, HHA)  Scooting: Moderate assistance  Transfers  Sit to stand: Minimal assistance;Contact guard assistance (from EOB, BSC and chair)  Stand to sit: Contact guard assistance;Minimal assistance  Transfer Comments: v.cues for hand placement /tech     Cognition  Overall Cognitive Status: [facility]  Orientation  Overall Orientation Status: Within Functional Limits                  Education Given To: Patient  Education Provided: ADL Adaptive Strategies;Transfer Training;IADL Safety;Precautions; Equipment  Education Provided Comments: Donnind / [place] Shoulder Immobilizer  Education Method: Demonstration;Verbal  Barriers to Learning: None  Education Outcome: Continued education needed                                                                          AM-PAC Score        AM-[facility] Inpatient Daily Activity Raw Score: 13 (12/17/22 0944)  AM-PAC Inpatient ADL T-Scale Score : 32.03 (12/17/22 0944)  ADL Inpatient CMS 0-100% Score: 63.03 (12/17/22 0944)  ADL Inpatient CMS G-Code Modifier : CL (12/17/22 0944)    Tinneti Score       Goals  Short Term Goals  Time Frame for Short Term Goals: at d/c  Short Term Goal 1: Stance x 2 mins with CGA for ADLs / toileting assist - not met  Short Term Goal 2: Functional transfer with CGA and LRAD prn-12/17 CGA-min, keep for consistency  Short Term Goal 3: Grooming with setup  - not met  Short Term Goal 4: Pt/ family demo donning / doffing Shoulder immobilizer with CGA  - not met  Patient Goals   Patient goals : Use my RUE again       Therapy Time   Individual Concurrent Group Co-treatment   Time In 0840         Time Out [address]         Minutes [address] [place], [place]

## 2022-12-17 NOTE — PROGRESS NOTES
Pt a/o x4. VSS on 2L of O2. Shoulder immobilizer remains in place. Pain managed per MAR. Voiding adequately via purewick. Fall precautions in place.

## 2022-12-17 NOTE — PLAN OF CARE
Problem: Pain  Goal: Verbalizes/displays adequate comfort level or baseline comfort level  12/16/2022 2253 by Anayeli Christian RN  Outcome: Progressing  12/16/2022 1128 by Mane De Leon RN  Outcome: Progressing     Problem: Safety - Adult  Goal: Free from fall injury  12/16/2022 2253 by Anayeli Christian RN  Outcome: Progressing  12/16/2022 1128 by Mane De Leon RN  Outcome: Progressing  Flowsheets (Taken 12/16/2022 1125)  Free From Fall Injury:   Jocelyne Alatorre family/caregiver on patient safety   Based on caregiver fall risk screen, instruct family/caregiver to ask for assistance with transferring infant if caregiver noted to have fall risk factors

## 2022-12-17 NOTE — PROGRESS NOTES
Physical Therapy  Facility/Department: [facility] 5T ORTHO/NEURO  Daily Treatment Note  NAME: Oscar Spears  : 1945  MRN: [id number]    Date of Service: 2022    Discharge Recommendations:Melina Mckeon scored a 14/24 on the AM-PAC short mobility form. Current research shows that an AM-PAC score of 17 or less is typically not associated with a discharge to the patient's home setting. Based on the patient's AM-PAC score and their current functional mobility deficits, it is recommended that the patient have 3-5 sessions per week of Physical Therapy at d/c to increase the patient's independence. Please see assessment section for further patient specific details. If patient discharges prior to next session this note will serve as a discharge summary. Please see below for the latest assessment towards goals. PT Equipment Recommendations  Equipment Needed: No (defer)    Patient Diagnosis(es): The encounter diagnosis was Rotator cuff arthropathy of right shoulder. Assessment   Assessment: The pt presents with slightly improved mobility today as she was able to ambulate a longer distance and perform more activities today. She does require significant assist getting out of bed but was min A with other mobility tasks with HHA. She is motivated to become more independent limited by pain, impaired LE strength, impaired balance, and decreased activity tolerance. She would benefit from further IP PT at discharge prior to returning home alone. Activity Tolerance: Patient limited by fatigue;Patient limited by endurance; Patient limited by pain  Equipment Needed: No (defer)     Plan    Physcial Therapy Plan  General Plan: 5-7 times per week  Current Treatment Recommendations: Transfer training;Functional mobility training;Strengthening;Gait training;Balance training; Endurance training; Safety education & training;Patient/Caregiver education & training; Therapeutic activities     Restrictions  Position Activity Restriction  Other position/activity restrictions: NWB R UE, shoulder immobilizer     Subjective    Subjective  Subjective: \"I am feeling ok. \" The pt presents supine in bed and willing to work with therapist.  Pain: reports surgical pain not rated, RN aware and patient repositioned for comfort at end of session  Orientation  Overall Orientation Status: Within Functional Limits  Cognition  Overall Cognitive Status: WFL     Objective   Vitals     Bed Mobility Training  Bed Mobility Training: Yes  Overall Level of Assistance: Additional time  Interventions: Verbal cues  Supine to Sit: Moderate assistance (HOB elevated and use of rail)  Scooting: Moderate assistance (to EOB)  Balance  Sitting: Intact (sitting EOB)  Standing: Impaired (min A and HHA)  Transfer Training  Transfer Training: Yes  Sit to Stand: Minimum assistance (from bed, from commode, from chair x 2)  Stand to Sit: Minimum assistance  Stand Pivot Transfers: Minimum assistance  Bed to Chair: Minimum assistance  Gait Training  Gait Training: Yes  Gait  Overall Level of Assistance: Minimum assistance (HHA on the L)  Interventions: Verbal cues (for safety with mobility)  Speed/Viola: Shuffled; Slow  Step Length: Left shortened;Right shortened  Gait Abnormalities: Decreased step clearance;Shuffling gait  Distance (ft): 5 Feet (+12')           Safety Devices  Type of Devices: Left in chair;Chair alarm in place;Call light within reach;Nurse notified;Gait belt       Goals  Short Term Goals  Time Frame for Short Term Goals: Discharge - all ongoing 12/16  Short Term Goal 1: supine <> sit SBA  Short Term Goal 2: sit <> stand Min A (met 12/15)  New goal:  sit <> stand SBA  Short Term Goal 3: bed <> chair Mod A x2 (met 12/15)  New goal:  bed <> chair SBA  Short Term Goal 4: ambulate 30ft with LRAD CGA  Patient Goals   Patient Goals : Return home    Education  Patient Education  Education Given To: Patient  Education Provided: Role of Therapy;Plan of Care;Transfer Training;Precautions  Education Provided Comments: .shoulder  Education Method: Verbal  Barriers to Learning: None  Education Outcome: Verbalized understanding    Therapy Time   Individual Concurrent Group Co-treatment   Time In 0840         Time Out 0910         Minutes 30         Timed Code Treatment Minutes: 30 Minutes   Timed Code Treatment Minutes:  30 Minutes    Total Treatment Minutes:  30 minutes      Beni Arriaza PT

## 2022-12-18 PROCEDURE — 6370000000 HC RX 637 (ALT 250 FOR IP): Performed by: INTERNAL MEDICINE

## 2022-12-18 PROCEDURE — 97535 SELF CARE MNGMENT TRAINING: CPT

## 2022-12-18 PROCEDURE — 1200000000 HC SEMI PRIVATE

## 2022-12-18 PROCEDURE — 97530 THERAPEUTIC ACTIVITIES: CPT

## 2022-12-18 PROCEDURE — 2580000003 HC RX 258

## 2022-12-18 PROCEDURE — 6370000000 HC RX 637 (ALT 250 FOR IP)

## 2022-12-18 RX ADMIN — HYDROXYZINE HYDROCHLORIDE 25 MG: 25 TABLET ORAL at 22:37

## 2022-12-18 RX ADMIN — ACETAMINOPHEN 650 MG: 325 TABLET, FILM COATED ORAL at 22:36

## 2022-12-18 RX ADMIN — DOCUSATE SODIUM 100 MG: 100 CAPSULE, LIQUID FILLED ORAL at 08:54

## 2022-12-18 RX ADMIN — OXYCODONE 10 MG: 5 TABLET ORAL at 10:20

## 2022-12-18 RX ADMIN — DOXYCYCLINE 50 MG: 50 CAPSULE ORAL at 22:37

## 2022-12-18 RX ADMIN — FLECAINIDE ACETATE 50 MG: 50 TABLET ORAL at 22:36

## 2022-12-18 RX ADMIN — PANTOPRAZOLE SODIUM 40 MG: 40 TABLET, DELAYED RELEASE ORAL at 06:15

## 2022-12-18 RX ADMIN — OXYCODONE 5 MG: 5 TABLET ORAL at 06:13

## 2022-12-18 RX ADMIN — SODIUM CHLORIDE, PRESERVATIVE FREE 10 ML: 5 INJECTION INTRAVENOUS at 08:53

## 2022-12-18 RX ADMIN — ASPIRIN 325 MG: 325 TABLET, COATED ORAL at 08:53

## 2022-12-18 RX ADMIN — DOCUSATE SODIUM 100 MG: 100 CAPSULE, LIQUID FILLED ORAL at 22:36

## 2022-12-18 RX ADMIN — FLECAINIDE ACETATE 50 MG: 50 TABLET ORAL at 08:54

## 2022-12-18 RX ADMIN — AMLODIPINE BESYLATE 2.5 MG: 2.5 TABLET ORAL at 08:53

## 2022-12-18 RX ADMIN — ATORVASTATIN CALCIUM 40 MG: 40 TABLET, FILM COATED ORAL at 22:36

## 2022-12-18 RX ADMIN — PREGABALIN 25 MG: 25 CAPSULE ORAL at 14:28

## 2022-12-18 RX ADMIN — MIRTAZAPINE 15 MG: 15 TABLET, FILM COATED ORAL at 22:37

## 2022-12-18 RX ADMIN — DOXAZOSIN 2 MG: 2 TABLET ORAL at 08:54

## 2022-12-18 RX ADMIN — SODIUM CHLORIDE, PRESERVATIVE FREE 10 ML: 5 INJECTION INTRAVENOUS at 22:37

## 2022-12-18 RX ADMIN — METOPROLOL SUCCINATE 50 MG: 50 TABLET, EXTENDED RELEASE ORAL at 08:53

## 2022-12-18 RX ADMIN — ACETAMINOPHEN 650 MG: 325 TABLET, FILM COATED ORAL at 10:20

## 2022-12-18 RX ADMIN — OXYCODONE 10 MG: 5 TABLET ORAL at 22:36

## 2022-12-18 RX ADMIN — ACETAMINOPHEN 650 MG: 325 TABLET, FILM COATED ORAL at 15:48

## 2022-12-18 RX ADMIN — PRIMIDONE 50 MG: 50 TABLET ORAL at 08:54

## 2022-12-18 RX ADMIN — OXYCODONE 10 MG: 5 TABLET ORAL at 18:36

## 2022-12-18 RX ADMIN — DOXYCYCLINE 50 MG: 50 CAPSULE ORAL at 08:54

## 2022-12-18 RX ADMIN — CETIRIZINE HYDROCHLORIDE 10 MG: 10 TABLET, FILM COATED ORAL at 08:53

## 2022-12-18 RX ADMIN — METOPROLOL SUCCINATE 50 MG: 50 TABLET, EXTENDED RELEASE ORAL at 22:39

## 2022-12-18 RX ADMIN — PREGABALIN 25 MG: 25 CAPSULE ORAL at 22:37

## 2022-12-18 RX ADMIN — OXYCODONE 10 MG: 5 TABLET ORAL at 02:17

## 2022-12-18 RX ADMIN — PRIMIDONE 50 MG: 50 TABLET ORAL at 22:37

## 2022-12-18 RX ADMIN — OXYCODONE 10 MG: 5 TABLET ORAL at 14:28

## 2022-12-18 RX ADMIN — PREGABALIN 25 MG: 25 CAPSULE ORAL at 08:54

## 2022-12-18 RX ADMIN — LEVOTHYROXINE SODIUM 75 MCG: 0.07 TABLET ORAL at 06:16

## 2022-12-18 ASSESSMENT — PAIN SCALES - GENERAL
PAINLEVEL_OUTOF10: 4
PAINLEVEL_OUTOF10: 8
PAINLEVEL_OUTOF10: 7
PAINLEVEL_OUTOF10: 8
PAINLEVEL_OUTOF10: 7
PAINLEVEL_OUTOF10: 5
PAINLEVEL_OUTOF10: 4
PAINLEVEL_OUTOF10: 3
PAINLEVEL_OUTOF10: 8

## 2022-12-18 ASSESSMENT — PAIN DESCRIPTION - LOCATION
LOCATION: SHOULDER

## 2022-12-18 ASSESSMENT — PAIN DESCRIPTION - ORIENTATION
ORIENTATION: RIGHT

## 2022-12-18 ASSESSMENT — PAIN DESCRIPTION - DESCRIPTORS
DESCRIPTORS: ACHING

## 2022-12-18 ASSESSMENT — PAIN DESCRIPTION - PAIN TYPE: TYPE: SURGICAL PAIN

## 2022-12-18 ASSESSMENT — PAIN DESCRIPTION - FREQUENCY: FREQUENCY: CONTINUOUS

## 2022-12-18 ASSESSMENT — PAIN DESCRIPTION - ONSET: ONSET: ON-GOING

## 2022-12-18 NOTE — PLAN OF CARE
Problem: Chronic Conditions and Co-morbidities  Goal: Patient's chronic conditions and co-morbidity symptoms are monitored and maintained or improved  Outcome: Progressing  Flowsheets (Taken 12/18/2022 1258)  Care Plan - Patient's Chronic Conditions and Co-Morbidity Symptoms are Monitored and Maintained or Improved:   Monitor and assess patient's chronic conditions and comorbid symptoms for stability, deterioration, or improvement   Collaborate with multidisciplinary team to address chronic and comorbid conditions and prevent exacerbation or deterioration   Update acute care plan with appropriate goals if chronic or comorbid symptoms are exacerbated and prevent overall improvement and discharge     Problem: Discharge Planning  Goal: Discharge to home or other facility with appropriate resources  Outcome: Progressing  Flowsheets (Taken 12/18/2022 1258)  Discharge to home or other facility with appropriate resources:   Identify barriers to discharge with patient and caregiver   Arrange for needed discharge resources and transportation as appropriate   Identify discharge learning needs (meds, wound care, etc)   Refer to discharge planning if patient needs post-hospital services based on physician order or complex needs related to functional status, cognitive ability or social support system     Problem: Pain  Goal: Verbalizes/displays adequate comfort level or baseline comfort level  12/18/2022 1258 by Cosmo Martinez RN  Outcome: Progressing  Flowsheets (Taken 12/18/2022 1258)  Verbalizes/displays adequate comfort level or baseline comfort level:   Encourage patient to monitor pain and request assistance   Assess pain using appropriate pain scale   Administer analgesics based on type and severity of pain and evaluate response   Implement non-pharmacological measures as appropriate and evaluate response   Consider cultural and social influences on pain and pain management   Notify Licensed Independent Practitioner if interventions unsuccessful or patient reports new pain  12/18/2022 0300 by Deargarth Moran RN  Outcome: Progressing     Problem: Respiratory - Adult  Goal: Achieves optimal ventilation and oxygenation  Outcome: Progressing  Flowsheets (Taken 12/18/2022 1258)  Achieves optimal ventilation and oxygenation:   Assess for changes in respiratory status   Assess for changes in mentation and behavior   Oxygen supplementation based on oxygen saturation or arterial blood gases   Position to facilitate oxygenation and minimize respiratory effort   Respiratory therapy support as indicated   Assess and instruct to report shortness of breath or any respiratory difficulty   Encourage broncho-pulmonary hygiene including cough, deep breathe, incentive spirometry   Assess the need for suctioning and aspirate as needed     Problem: ABCDS Injury Assessment  Goal: Absence of physical injury  Outcome: Progressing  Flowsheets (Taken 12/18/2022 1258)  Absence of Physical Injury: Implement safety measures based on patient assessment     Problem: Safety - Adult  Goal: Free from fall injury  Outcome: Progressing  Flowsheets (Taken 12/18/2022 1258)  Free From Fall Injury: Instruct family/caregiver on patient safety

## 2022-12-18 NOTE — PLAN OF CARE
Problem: Pain  Goal: Verbalizes/displays adequate comfort level or baseline comfort level  Outcome: Progressing   Controlled with PO per pt. Problem: Skin/Tissue Integrity  Goal: Absence of new skin breakdown  Description: 1. Monitor for areas of redness and/or skin breakdown  2. Assess vascular access sites hourly  3. Every 4-6 hours minimum:  Change oxygen saturation probe site  4. Every 4-6 hours:  If on nasal continuous positive airway pressure, respiratory therapy assess nares and determine need for appliance change or resting period. Outcome: Progressing   Some blanchable redness to buttock noted. Pt turned and repositioned. No new breakdown noted.

## 2022-12-18 NOTE — PROGRESS NOTES
VSS. Denies N/v. NV intact. Immobilizer in place , ice applied. Dressing CDI. Pt voids WNL to purewick overnight. Pain controlled with PO. Bed alarm set. Call light in reach. Will continue to monitor.

## 2022-12-18 NOTE — PROGRESS NOTES
Orthopaedic Surgery Fellow Post Operative right Reverse Total Shoulder Rounding Progress Note    History of Present Illness:  Clover Alexis is a 68 y.o. female patient who was seen this morning. There were no events overnight. Pain is controlled. They currently deny any dizziness, fevers, chills, shortness of breath, chest pain, paresthesias, or any other current complaints. Medical History:  Patient's medications, allergies, past medical, surgical, social and family histories were reviewed and updated as appropriate. They are as noted. Social History     Socioeconomic History    Marital status:      Spouse name: Not on file    Number of children: Not on file    Years of education: Not on file    Highest education level: Not on file   Occupational History    Not on file   Tobacco Use    Smoking status: Never    Smokeless tobacco: Never   Vaping Use    Vaping Use: Never used   Substance and Sexual Activity    Alcohol use: Not Currently    Drug use: Not on file    Sexual activity: Not on file   Other Topics Concern    Not on file   Social History Narrative    Not on file     Social Determinants of Health     Financial Resource Strain: Not on file   Food Insecurity: Not on file   Transportation Needs: Not on file   Physical Activity: Not on file   Stress: Not on file   Social Connections: Not on file   Intimate Partner Violence: Not on file   Housing Stability: Not on file       Allergies   Allergen Reactions    Ibuprofen Hives    Meperidine Hives    Meperidine Hcl      Other reaction(s): Unknown    Olanzapine      Other reaction(s): \"shakiness\"    Furosemide Swelling     Other reaction(s): Unknown  Pt states that she cannot take generic form (furosemide)  Face and throat swelling      Lidocaine Rash     Other reaction(s): Unknown  Other reaction(s): Unknown    Povidone-Iodine Rash     TOPICAL IODINE     No current facility-administered medications on file prior to encounter.      Current Outpatient Medications on File Prior to Encounter   Medication Sig Dispense Refill    cetirizine (ZYRTEC) 10 MG tablet Take 10 mg by mouth daily      docusate sodium (COLACE) 100 MG capsule Take 100 mg by mouth 2 times daily      albuterol sulfate HFA (PROVENTIL;VENTOLIN;PROAIR) 108 (90 Base) MCG/ACT inhaler 2 puffs as needed      amLODIPine (NORVASC) 2.5 MG tablet TAKE 1 TABLET BY MOUTH ONCE DAILY      atorvastatin (LIPITOR) 40 MG tablet Take 40 mg by mouth nightly      bisacodyl (DULCOLAX) 5 MG EC tablet Take 5 mg by mouth      Calcium Polycarbophil (FIBER) 625 MG TABS Take 625 mg by mouth daily      doxazosin (CARDURA) 2 MG tablet Take 2 mg by mouth daily      ferrous sulfate (IRON 325) 325 (65 Fe) MG tablet Take 325 mg by mouth 2 times daily (with meals)      flecainide (TAMBOCOR) 50 MG tablet Take 50 mg by mouth every 12 hours      Fluticasone-Umeclidin-Vilant (TRELEGY ELLIPTA) 200-62.5-25 MCG/INH AEPB Inhale 1 puff into the lungs daily      hydrOXYzine HCl (ATARAX) 25 MG tablet Take 25 mg by mouth nightly      levothyroxine (SYNTHROID) 75 MCG tablet Take 75 mcg by mouth daily      metoprolol succinate (TOPROL XL) 50 MG extended release tablet Take 50 mg by mouth 2 times daily      mirtazapine (REMERON) 15 MG tablet Take 15 mg by mouth nightly      pantoprazole (PROTONIX) 40 MG tablet       pregabalin (LYRICA) 25 MG capsule Take 25 mg by mouth 3 times daily. primidone (MYSOLINE) 50 MG tablet Take 50 mg by mouth every 12 hours       family history is not on file.   Past Medical History:   Diagnosis Date    A-fib [facility])     Arthritis     Asthma     CAD (coronary artery disease)     Cancer (HCC)     CHF (congestive heart failure) (HCC)     COPD (chronic obstructive pulmonary disease) (HCC)     CPAP     sleep apnea    Depression     Hyperlipidemia     Hypertension     Paralysis of diaphragm     partial right    Thyroid disease      Past Medical History:   Diagnosis Date    A-fib ([facility]     Arthritis     Asthma     CAD (coronary artery disease)     Cancer (HCC)     CHF (congestive heart failure) (HCC)     COPD (chronic obstructive pulmonary disease) (HCC)     CPAP     sleep apnea    Depression     Hyperlipidemia     Hypertension     Paralysis of diaphragm     partial right    Thyroid disease      Active Ambulatory Problems     Diagnosis Date Noted    Acute on chronic combined systolic and diastolic congestive heart failure ([facility] 04/11/2017    Age-related osteoporosis without current pathological fracture 07/11/2022    Bilateral edema of lower extremity 02/29/2016    Chronic respiratory failure with hypoxia and hypercapnia ([facility] 11/30/2021    COPD (chronic obstructive pulmonary disease) ([facility] 04/09/2021     Resolved Ambulatory Problems     Diagnosis Date Noted    No Resolved Ambulatory Problems     Past Medical History:   Diagnosis Date    A-fib (HCC)     Arthritis     Asthma     CAD (coronary artery disease)     Cancer (HCC)     CHF (congestive heart failure) (HCC)     CPAP     Depression     Hyperlipidemia     Hypertension     Paralysis of diaphragm     Thyroid disease        Review of Systems:  Gen: No dizziness, fevers, chills  HEENT: Negative for double vision, visual changes  CV: Negative for chest pain, palpitations  Lungs: Negative for shortness of breath or wheezing  Abdomen: Negative for abdominal pain or bloating   Neurological: Negative for numbness, paresthesias, or weakness  Psychiatric: alert and oriented to person, place and time. Urological: Negative for urinary retention    Vital Signs:  Vitals:    12/18/22 1019   BP: 126/66   Pulse: 69   Resp: 18   Temp: 98 °F (36.7 °C)   SpO2: 91%     Height: 5' 5\" (165.1 cm), Weight: 203 lb 0.7 oz (92.1 kg), Body mass index is 33.79 kg/m². ,    Physical Exam:     Appearance: Meme Arias is alert, oriented x 3, resting comforably, no acute distress. Right Shoulder Exam:  Sling is in place. Dressing is clean, dry, and intact.  Compartments are soft and compressible in the arm and forearm. Motor intact with wrist and thumb extension, finger and wrist flexion, finger abduction, and pinch. Sensation intact in the axillary, radial, median and ulnar nerves. 2/2 Radial  pulse, handwarm with brisk capillary refill < 2 seconds. Superficial skin blistering in axillary region, away from incision site, small amount of sloughing, not concerned for infection     Assessment:  1). S/p right Reverse total shoulder arthroplasty, post-operative day 5    Treatment Plan:    1). Maintain dressing, reinforce as needed per nursing  2). Pain control PRN  3). DVT ppx  4). Ice PRN  5). Encourage diet  6). GI ppx  7). PT/OT, increase activity as tolerated per protocol, NWB and Sling Full Time  8). Nutritional support  9).  Hospitalist recommendations        Disposition: Anticipate discharge to inpatient rehab once insurance sorted  Clear for discharge once this happens      Briseyda Streeter MD [facility]    Orthopaedic Surgeon, Clinical Fellow  [address] [facility]   On behalf of Dr Gamaliel Norris

## 2022-12-18 NOTE — PROGRESS NOTES
Occupational Therapy  Facility/Department: [facility]  Occupational Therapy Daily Treatment Note    Date: 22  Patient Name: Oscar Spears       Room: [facility]/2331-  MRN: [id number]  Account: [de-identified]   : 1945  (68 y.o.) Gender: female           Oscar Spears scored a 13/24 on the [address] form. Current research shows that an AM-PAC score of 17 or less is typically not associated with a discharge to the patient's home setting. Based on the patient's AM-PAC score and their current ADL deficits, it is recommended that the patient have 3-5 sessions per week of Occupational Therapy at d/c to increase the patient's independence. Please see assessment section for further patient specific details. If patient discharges prior to next session this note will serve as a discharge summary. Please see below for the latest assessment towards goals. Assessment    Assessment: Pt demonstrated improved participation in OT today as evident by pt ability to tolerate ambulation to/from the bathroom w/ HH assist w/ CGA-min A. Pt however fatigued quickly and required seated rest break skilled nursing to/from the bathroom. Pt is limited by shoulder immobilizer and required max A for UE dressing and mod A - max A for LE dressing and toileting tasks. Pt does not have any support available at home and she is currently unsafe to d/c home alone. Pt is hopeful she can go to a rehab facility for ongoing therapy. Pt continues to benefit from OT to maximize functional independence. Cont OT per POC    Past Medical History:  has a past medical history of A-fib ([address] Arthritis, Asthma, CAD (coronary artery disease), Cancer ([address] CHF (congestive heart failure) ([address] COPD (chronic obstructive pulmonary disease) ([address] CPAP, Depression, Hyperlipidemia, Hypertension, Paralysis of diaphragm, and Thyroid disease.   Past Surgical History:   has a past surgical history that includes Hysterectomy, total abdominal; Ankle surgery (Right); Mastectomy (Right); Tonsillectomy; Cholecystectomy, laparoscopic; and shoulder surgery (Right, 12/13/2022). Restrictions  Other position/activity restrictions: NWB R UE, shoulder immobilizer    Subjective  Subjective: Pt was semi supine in bed upon arrival. Pt was pleasant and agreeable to OT. Pt on 2L O2              Objective     Cognition  Overall Cognitive Status: WFL  Orientation  Overall Orientation Status: Within Functional Limits         ADL    Upper Extremity Dressing  Assistance Level: Set-up; Verbal cues; Increased time to complete;Maximum assistance  Skilled Clinical Factors: OT reviewed instructions regarding how to doff and don shoulder immobilizer. Pt required max A for success w/ doffing and donning. Pt was only able to assist w/ L shoulder strap and required assistance for all other components. Pt's gown was saturated w/ blood from drainage at R shoulder. OT assisted w/ doffing gown and donned clean gown. Pt requested to have dressing changed, RN notified. Lower Extremity Dressing  Assistance Level: Maximum assistance;Set-up; Verbal cues; Increased time to complete  Skilled Clinical Factors: Depends doffed and pt donned clean underwear seated on toilet w/ assistance needed to thread BLEs and assistance to manage clothes up on the R side. Pt was able to adjust underwear on the L. Toileting  Assistance Level: Moderate assistance; Increased time to complete  Skilled Clinical Factors: Pt was continent of urine seated on toilet. Pt completed janet care I'ly. Pt required assistance for pants management up/down on the R side.  Pt was able to assist on the L  Toilet Transfers  Technique:  (ambulating)  Equipment: Standard toilet (GB on the L)  Assistance Level: Verbal cues;Contact guard assist  Skilled Clinical Factors: Pt required VCs for safe descent to toilet         Functional Mobility  Device:  (HH assist)  Activity: To/From bathroom  Assistance Level: Minimal assistance  Skilled Clinical Factors: Pt ambulated w/ HH assist to/from the bathroom w/ CGA-Min A. Pt required seated rest break prison to/from bathroom. Pt SOB on 2L O2 but recovered quickly w/ rest.  Supine to Sit  Assistance Level: Contact guard assist  Skilled Clinical Factors: + time needed to complete and VCs for safety. CGA at trunk to avoid pushing through RUE  Sit to Stand  Assistance Level: Contact guard assist  Skilled Clinical Factors: CGA from EOB, recliner chair, and chair w/ arms  Stand to Sit  Assistance Level: Contact guard assist  Skilled Clinical Factors: VCs to reach back w/ LUE upon descent       OT Exercises  A/AROM Exercises: OT educated pt to complete elbow flexion, wrist flexion/extension, and finger flexion/extension. Pt completed 5 reps at each joint w/ VCs to prevent moving at the shoulder. Pt verb understanding. Activity Tolerance: Patient tolerated treatment well;Patient limited by fatigue  OT Equipment Recommendations  Other: defer to next care facility  Safety Devices  Safety Devices in place: Yes  Type of devices: Chair alarm in place; Left in chair;Call light within reach;Nurse notified    Patient Education  Education  Education Given To: Patient  Education Provided: Role of Therapy;Plan of Care;Equipment; Mobility Training;Home Exercise Program;Transfer Training;Precautions; Safety;ADL Function  Education Provided Comments: OT removed purewick and instructed pt to get up to the bathroom or at least [facility] for activity promotion. Pt was in agreement w/ plan and verbalized understanding. Education Method: Demonstration;Verbal  Barriers to Learning: None  Education Outcome: Verbalized understanding;Continued education needed    Plan  Occupational Therapy Plan  Times Per Week: 5-7  Times Per Day: Once a day  Current Treatment Recommendations: Safety education & training;Self-Care / ADL; Patient/Caregiver education & training; Endurance training;Functional mobility training    Goals  Patient Goals Patient goals : Use my RUE again  Short Term Goals  Time Frame for Short Term Goals: at d/c  Short Term Goal 1: Stance x 2 mins with CGA for ADLs / toileting assist - goal met 12/18  Short Term Goal 2: Functional transfer with CGA and LRAD prn-12/17 CGA-min, keep for consistency  Short Term Goal 3: Grooming with setup  - not met  Short Term Goal 4: Pt/ family demo donning / doffing Shoulder immobilizer with CGA  - not met    AM-PAC Score        AM-PAC Inpatient Daily Activity Raw Score: 13 (12/18/22 1205)  AM-PAC Inpatient ADL T-Scale Score : 32.03 (12/18/22 1205)  ADL Inpatient CMS 0-100% Score: 63.03 (12/18/22 1205)  ADL Inpatient CMS G-Code Modifier : CL (12/18/22 1205)      Therapy Time   Individual Concurrent Group Co-treatment   Time In 1022         Time Out 1116         Minutes 54         Timed Code Treatment Minutes: [address],

## 2022-12-19 VITALS
DIASTOLIC BLOOD PRESSURE: 73 MMHG | RESPIRATION RATE: 18 BRPM | WEIGHT: 202.82 LBS | TEMPERATURE: 98.1 F | BODY MASS INDEX: 33.79 KG/M2 | OXYGEN SATURATION: 92 % | HEIGHT: 65 IN | HEART RATE: 72 BPM | SYSTOLIC BLOOD PRESSURE: 129 MMHG

## 2022-12-19 PROCEDURE — 6370000000 HC RX 637 (ALT 250 FOR IP): Performed by: INTERNAL MEDICINE

## 2022-12-19 PROCEDURE — 97530 THERAPEUTIC ACTIVITIES: CPT

## 2022-12-19 PROCEDURE — 6370000000 HC RX 637 (ALT 250 FOR IP)

## 2022-12-19 PROCEDURE — 97116 GAIT TRAINING THERAPY: CPT

## 2022-12-19 PROCEDURE — 97535 SELF CARE MNGMENT TRAINING: CPT

## 2022-12-19 PROCEDURE — 2580000003 HC RX 258

## 2022-12-19 RX ADMIN — FLECAINIDE ACETATE 50 MG: 50 TABLET ORAL at 09:00

## 2022-12-19 RX ADMIN — ASPIRIN 325 MG: 325 TABLET, COATED ORAL at 20:26

## 2022-12-19 RX ADMIN — AMLODIPINE BESYLATE 2.5 MG: 2.5 TABLET ORAL at 09:00

## 2022-12-19 RX ADMIN — ATORVASTATIN CALCIUM 40 MG: 40 TABLET, FILM COATED ORAL at 20:25

## 2022-12-19 RX ADMIN — ACETAMINOPHEN 650 MG: 325 TABLET, FILM COATED ORAL at 04:00

## 2022-12-19 RX ADMIN — PRIMIDONE 50 MG: 50 TABLET ORAL at 20:25

## 2022-12-19 RX ADMIN — OXYCODONE 10 MG: 5 TABLET ORAL at 21:02

## 2022-12-19 RX ADMIN — OXYCODONE 10 MG: 5 TABLET ORAL at 03:25

## 2022-12-19 RX ADMIN — ACETAMINOPHEN 650 MG: 325 TABLET, FILM COATED ORAL at 09:00

## 2022-12-19 RX ADMIN — ASPIRIN 325 MG: 325 TABLET, COATED ORAL at 09:00

## 2022-12-19 RX ADMIN — DOCUSATE SODIUM 100 MG: 100 CAPSULE, LIQUID FILLED ORAL at 09:00

## 2022-12-19 RX ADMIN — ACETAMINOPHEN 650 MG: 325 TABLET, FILM COATED ORAL at 15:20

## 2022-12-19 RX ADMIN — MIRTAZAPINE 15 MG: 15 TABLET, FILM COATED ORAL at 20:26

## 2022-12-19 RX ADMIN — PREGABALIN 25 MG: 25 CAPSULE ORAL at 09:00

## 2022-12-19 RX ADMIN — DOCUSATE SODIUM 100 MG: 100 CAPSULE, LIQUID FILLED ORAL at 20:26

## 2022-12-19 RX ADMIN — PRIMIDONE 50 MG: 50 TABLET ORAL at 09:00

## 2022-12-19 RX ADMIN — HYDROXYZINE HYDROCHLORIDE 25 MG: 25 TABLET ORAL at 20:26

## 2022-12-19 RX ADMIN — LEVOTHYROXINE SODIUM 75 MCG: 0.07 TABLET ORAL at 06:40

## 2022-12-19 RX ADMIN — METOPROLOL SUCCINATE 50 MG: 50 TABLET, EXTENDED RELEASE ORAL at 08:59

## 2022-12-19 RX ADMIN — DOXYCYCLINE 50 MG: 50 CAPSULE ORAL at 21:06

## 2022-12-19 RX ADMIN — CETIRIZINE HYDROCHLORIDE 10 MG: 10 TABLET, FILM COATED ORAL at 09:00

## 2022-12-19 RX ADMIN — PANTOPRAZOLE SODIUM 40 MG: 40 TABLET, DELAYED RELEASE ORAL at 06:38

## 2022-12-19 RX ADMIN — DOXAZOSIN 2 MG: 2 TABLET ORAL at 09:00

## 2022-12-19 RX ADMIN — METOPROLOL SUCCINATE 50 MG: 50 TABLET, EXTENDED RELEASE ORAL at 20:25

## 2022-12-19 RX ADMIN — OXYCODONE 10 MG: 5 TABLET ORAL at 11:06

## 2022-12-19 RX ADMIN — DOXYCYCLINE 50 MG: 50 CAPSULE ORAL at 09:00

## 2022-12-19 RX ADMIN — PREGABALIN 25 MG: 25 CAPSULE ORAL at 15:20

## 2022-12-19 RX ADMIN — ACETAMINOPHEN 650 MG: 325 TABLET, FILM COATED ORAL at 20:26

## 2022-12-19 RX ADMIN — FLECAINIDE ACETATE 50 MG: 50 TABLET ORAL at 21:06

## 2022-12-19 RX ADMIN — PREGABALIN 25 MG: 25 CAPSULE ORAL at 20:25

## 2022-12-19 RX ADMIN — SODIUM CHLORIDE, PRESERVATIVE FREE 10 ML: 5 INJECTION INTRAVENOUS at 09:01

## 2022-12-19 RX ADMIN — OXYCODONE 10 MG: 5 TABLET ORAL at 17:29

## 2022-12-19 ASSESSMENT — PAIN DESCRIPTION - ORIENTATION
ORIENTATION: RIGHT

## 2022-12-19 ASSESSMENT — PAIN DESCRIPTION - LOCATION
LOCATION: BACK;SHOULDER
LOCATION: BACK;SHOULDER
LOCATION: SHOULDER
LOCATION: BACK;SHOULDER
LOCATION: BACK;SHOULDER
LOCATION: SHOULDER

## 2022-12-19 ASSESSMENT — PAIN SCALES - GENERAL
PAINLEVEL_OUTOF10: 6
PAINLEVEL_OUTOF10: 4
PAINLEVEL_OUTOF10: 5
PAINLEVEL_OUTOF10: 8
PAINLEVEL_OUTOF10: 9
PAINLEVEL_OUTOF10: 6

## 2022-12-19 ASSESSMENT — PAIN DESCRIPTION - DESCRIPTORS
DESCRIPTORS: ACHING
DESCRIPTORS: ACHING

## 2022-12-19 ASSESSMENT — PAIN DESCRIPTION - FREQUENCY
FREQUENCY: INTERMITTENT
FREQUENCY: INTERMITTENT

## 2022-12-19 ASSESSMENT — PAIN - FUNCTIONAL ASSESSMENT
PAIN_FUNCTIONAL_ASSESSMENT: PREVENTS OR INTERFERES WITH ALL ACTIVE AND SOME PASSIVE ACTIVITIES
PAIN_FUNCTIONAL_ASSESSMENT: PREVENTS OR INTERFERES WITH ALL ACTIVE AND SOME PASSIVE ACTIVITIES

## 2022-12-19 ASSESSMENT — PAIN DESCRIPTION - PAIN TYPE
TYPE: ACUTE PAIN;SURGICAL PAIN
TYPE: ACUTE PAIN;SURGICAL PAIN

## 2022-12-19 ASSESSMENT — PAIN DESCRIPTION - ONSET
ONSET: ON-GOING
ONSET: ON-GOING

## 2022-12-19 NOTE — PROGRESS NOTES
Occupational Therapy  Occupational Therapy  Daily Treatment Note  Patient Name: Manuela Castellanos  MRN: [id number]    Assessment:   Pt limited by R shoulder immobilizer and NWB precautions. Functional mobility demonstrated to and from bathroom with HHA and CGA to Min A. Pt requiring SOB after each mobility. LE dressing requiring Mod A. Pt would benefit from inpt OT for maximizing functional independence and safety. Continue OT per POC. Discharge Recommendations:   Manuela Castellanos scored a 14/24 on the AM-PAC ADL Inpatient form. Current research shows that an AM-PAC score of 17 or less is typically not associated with a discharge to the patient's home setting. Based on the patient's AM-PAC score and their current ADL deficits, it is recommended that the patient have 3-5 sessions per week of Occupational Therapy at d/c to increase the patient's independence. Please see assessment section for further patient specific details. If patient discharges prior to next session this note will serve as a discharge summary. Please see below for the latest assessment towards goals. Equipment Needs:      Chart Reviewed: Yes       Other position/activity restrictions: NWB R UE, shoulder immobilizer     Additional Pertinent Hx: Pt admitted 12/13 with R shoulder pain s/p reverse total shoulder replacement. PMH:  asthma, COPD, HTN, OA, CAD, CHF, depression, cancer, Afib      Diagnosis: s/p R TSA  Treatment Diagnosis: impaired ADLs / functional mobility 2/2 R TSA / R shoulder immobilizer    Subjective:  pt met supine in bed and agreeable to OT session. Pain:   Increased pain with movement. Pt repositioned to comfort and ice provided.  rN informed    Social/Functional History  Lives With: Alone  Type of Home: Senior housing apartment  Home Layout: One level  Home Access: Level entry  Bathroom Shower/Tub: Tub/Shower unit  Bathroom Toilet: Standard (toilet safety frame)  Bathroom Equipment: Tub transfer bench, Grab bars in shower (pull cord in bathroom)  Home Equipment: Oxygen, Lift chair, Alert Button (Upright 4 wheeled walker, 2L Home O2)  Has the patient had two or more falls in the past year or any fall with injury in the past year?: No (1 fall in April- resulted in broken ribs)  ADL Assistance: Needs assistance (HHA 2x week for showers, Pt struggles with dressing)  Homemaking Assistance: Needs assistance (Daughter-in-law does laundry, cleaning and grocery shopping. Pt makes microwave meals)  Ambulation Assistance: Independent (Upright 4 weeled walker)  Transfer Assistance: Independent  Active : No  Additional Comments: Pt hoping to go to [employer] in [facility]  Prior Function  ADL Assistance: Needs assistance (HHA 2x week for showers, Pt struggles with dressing)  Homemaking Assistance: Needs assistance (Daughter-in-law does laundry, cleaning and grocery shopping. Pt makes microwave meals)  Ambulation Assistance: Independent (Upright 4 weeled walker)  Transfer Assistance: Independent  Additional Comments: Pt hoping to go to [employer] in [facility]    Objective:    Cognition/Orientation:  WFL    Bed mobility   Supine to sit: SBA with ++increased time and Vcs for tech  Scooting: SBA scooting to EOB    Functional Mobility   Sit to Stand: CGA to Min A fromEOB and toilet  Stand to Sit: CGA   Bed to Chair Transfer: CGA with Vcs for positioning and NWB precautions on RUE  Commode Transfer: CGA  Other: Functional mobility to and from bathroom with seated rest break half way from toilet to recliner chair with HHA and CGA to Min A. ADLs   Grooming: Min A washing hand in stance at sink  UB dressing: pt requiring Max A for readjustment of Shoulder Immobilizer  LB dressing: Mod  A donning briefs with pt able to pull over hips on L side  Toileting: Min A for pants up on R and pt able to complete janet care seated with increased time and effort.        UE Exercises   10 reps grasp release  10 reps wrist flex/ext  10 reps elbow flex/ext    Activity Tolerance:  Pt limited by SOB general fatigue and R shoulder immobilizer and NWB precautions    Patient Education:   Pt educated on precautions, RUE exercise and  shoulder immobilizer placement      Safety Devices in Place:  Pt left seated in recliner chair with alarm on and call light in reach. Therapy Time:   Individual Concurrent Group Co-treatment   Time In  1145         Time Out  1223         Minutes  38           Timed Code Treatment Minutes:  38    Total Treatment Minutes:       Timed Code Treatment Minutes: Total Treatment Time:     Goals: (as determined and assessed by primary OT)   Short Term Goals  Time Frame for Short Term Goals: at d/c  Short Term Goal 1: Stance x 2 mins with CGA for ADLs / toileting assist - goal met 12/18  Short Term Goal 2: Functional transfer with CGA and LRAD prn-12/17 CGA-min, keep for consistency  Short Term Goal 3: Grooming with setup  - not met  Short Term Goal 4: Pt/ family demo donning / doffing Shoulder immobilizer with CGA  - not met         Plan:      Times Per Week: 5-7   Times Per Day: Once a day    If patient is discharged prior to next treatment, this note will serve as the discharge summary.       [address] [place] [address], Ne, BUITRAGO/L

## 2022-12-19 NOTE — PROGRESS NOTES
Physical Therapy  Facility/Department: [place] [address]  Physical Therapy  Treatment    Name: Jackson Adkins  : 1945  MRN: [id number]  Date of Service: 2022    Discharge Recommendations:  Jackson Adkins scored a 14/24 on the AM-PAC short mobility form. Current research shows that an AM-PAC score of 17 or less is typically not associated with a discharge to the patient's home setting. Based on the patient's AM-PAC score and their current functional mobility deficits, it is recommended that the patient have 3-5 sessions per week of Physical Therapy at d/c to increase the patient's independence. Please see assessment section for further patient specific details. If patient discharges prior to next session this note will serve as a discharge summary. Please see below for the latest assessment towards goals. DME - determined by next LOC      Patient Diagnosis(es): The encounter diagnosis was Rotator cuff arthropathy of right shoulder. Past Medical History:  has a past medical history of A-fib ([address] Arthritis, Asthma, CAD (coronary artery disease), Cancer ([address] CHF (congestive heart failure) ([address] COPD (chronic obstructive pulmonary disease) ([address] CPAP, Depression, Hyperlipidemia, Hypertension, Paralysis of diaphragm, and Thyroid disease. Past Surgical History:  has a past surgical history that includes Hysterectomy, total abdominal; Ankle surgery (Right); Mastectomy (Right); Tonsillectomy; Cholecystectomy, laparoscopic; and shoulder surgery (Right, 2022). Assessment   Assessment: Pt lives alone and is normally independent with functional mobility and gait with walker. She is O2 dependent and has family who can assist with grocery shopping, but cannot provide 24 hr care. Pt's transfers and gait are slow and effortful.   She is currently well below her baseline and will benefit from continued IP therapies to maximize mobility, safety and independence  Activity Tolerance  Activity Tolerance Comments: Pt slightly SOB post gait, 94% on 2 l     Plan   Physcial Therapy Plan  General Plan: 5-7 times per week  Current Treatment Recommendations: Transfer training, Functional mobility training, Strengthening, Gait training, Balance training, Endurance training, Safety education & training, Patient/Caregiver education & training, Therapeutic activities  Safety Devices  Type of Devices: Left in chair, Chair alarm in place, Call light within reach, Nurse notified, Gait belt     Restrictions  Position Activity Restriction  Other position/activity restrictions: NWB R UE, shoulder immobilizer     Subjective   General Comment  Comments: Pt in chair upon PT entry, agreeable to working with PT         Social/Functional History  Social/Functional History  Lives With: Alone  Type of Home: Senior housing apartment  Home Layout: One level  Home Access: Level entry  Bathroom Shower/Tub: Tub/Shower unit  Bathroom Toilet: Standard (toilet safety frame)  Bathroom Equipment: Tub transfer bench, Grab bars in shower (pull cord in bathroom)  Home Equipment: Oxygen, Lift chair, Alert Button (Upright 4 wheeled walker, 2L Home O2)  Has the patient had two or more falls in the past year or any fall with injury in the past year?: No (1 fall in April- resulted in broken ribs)  ADL Assistance: Needs assistance (HHA 2x week for showers, Pt struggles with dressing)  Homemaking Assistance: Needs assistance (Daughter-in-law does laundry, cleaning and grocery shopping.   Pt makes microwave meals)  Ambulation Assistance: Independent (Upright 4 weeled walker)  Transfer Assistance: Independent  Active : No  Additional Comments: Pt hoping to go to [facility]      Objective   Heart Rate: 75  Heart Rate Source: Monitor  BP: (!) 146/90  BP Location: Left upper arm  BP Method: Automatic  Patient Position: Semi fowlers  MAP (Calculated): 109  Resp: 16  SpO2: 91 %  O2 Device: Nasal cannula     Observation/Palpation  Observation: Right shoulder immobilizer         Transfers  Sit to Stand: Contact guard assistance (from recliner)  Stand to Sit: Contact guard assistance (to recliner)  Ambulation  Surface: Level tile  Device: Hemiwalker  Other Apparatus: O2 (2l via NC)  Assistance: Minimal assistance  Quality of Gait: slightly unsteady, cues for hemiwalker placement and step length.   Gait Deviations: Slow Viola;Decreased step length;Decreased step height  Distance: 8 ft, 15 ft        AM-PAC Score  AM-PAC Inpatient Mobility Raw Score : 14 (12/19/22 1509)  AM-PAC Inpatient T-Scale Score : 38.1 (12/19/22 1509)  Mobility Inpatient CMS 0-100% Score: 61.29 (12/19/22 1509)  Mobility Inpatient CMS G-Code Modifier : CL (12/19/22 1509)     Goals  Short Term Goals  Time Frame for Short Term Goals: Discharge - all ongoing 12/19  Short Term Goal 1: supine <> sit SBA  Short Term Goal 2: sit <> stand Min A (met 12/15)  New goal:  sit <> stand SBA  Short Term Goal 3: bed <> chair Mod A x2 (met 12/15)  New goal:  bed <> chair SBA  Short Term Goal 4: ambulate 30ft with LRAD CGA  Patient Goals   Patient Goals : Return home       Education role of PT, safety, transfers, gait       Therapy Time   Individual Concurrent Group Co-treatment   Time In 1425         Time Out 1455         Minutes 30             Timed Code Treatment Minutes:  30 Minutes    Total Treatment Minutes:  Nalini 83 [id number]

## 2022-12-19 NOTE — PROGRESS NOTES
VSS. Pain controlled with PO. Denies nausea. Dressing CDI. NV intact. Ice applied. Immobilizer on and aligned. Purewick utilized overnight with adequate output. Bed alarm set. Call light in reach. Will continue to monitor.

## 2022-12-19 NOTE — PLAN OF CARE
Problem: Respiratory - Adult  Goal: Achieves optimal ventilation and oxygenation  12/18/2022 2047 by Nely Paredes RN  Outcome: Progressing   VSS. Pt on 2 lpm baseline. Pt utilizing O2 in place of CPAP until she goes home as she prefers her own equipment which is not here. Pt SaO2 has been WNL with this regimen. Problem: Pain  Goal: Verbalizes/displays adequate comfort level or baseline comfort level  12/18/2022 2047 by Nely Paredes RN  Outcome: Progressing   Controlled with PO.

## 2022-12-19 NOTE — CARE COORDINATION
Patient is from home alone has home oxygen with [employer], portable O2 concentrator, has cpap, uses a walker at baseline. Pt is wanting to DC to [facility], insurance denied, expedited appeal was submitted on 12/16. HAILEE spoke with Abelardo Xie at [facility] [phone], there are no updates regarding appeal yet, she will call SW once a decision has been made. SW following.   Electronically signed by ROSITA Jenkins, DENZEL on 12/19/2022 at 11:42 AM  [phone]

## 2022-12-19 NOTE — CARE COORDINATION
Case Management Assessment            Discharge Note                    Date / Time of Note: 12/19/2022 4:23 PM                  Discharge Note Completed by: Bre Barrios RN    Patient Name: Braulio Hurt   YOB: 1945  Diagnosis: Right shoulder pain, unspecified chronicity [M25.511]  S/P reverse total shoulder arthroplasty, right [Z96.611]   Date / Time: 12/13/2022  5:40 AM    Current PCP: Humberto Coffey DO  Clinic patient: No    Hospitalization in the last 30 days: No    Advance Directives:  Code Status: Full Code  [place] DNR form completed and on chart: Not Indicated    Financial:  Payor: Diya Villeda / Plan: Mariela Bazan ESSENTIAL/PLUS / Product Type: *No Product type* /      Pharmacy:    [address] [place] [address] [phone] [facility] [phone]  [id number]  Phone: [phone] Fax: [phone] medications?:    Assistance provided by Case Management: None at this time    Does patient want to participate in local refill/ meds to beds program?:      Meds To Beds General Rules:  1. Can ONLY be done Monday- Friday between 8:30am-5pm  2. Prescription(s) must be in pharmacy by 3pm to be filled same day  3. Copy of patient's insurance/ prescription drug card and patient face sheet must be sent along with the prescription(s)  4. Cost of Rx cannot be added to hospital bill. If financial assistance is needed, please contact unit  or ;  or  CANNOT provide pharmacy voucher for patients co-pays  5.  Patients can then  the prescription on their way out of the hospital at discharge, or pharmacy can deliver to the bedside if staff is available. (payment due at time of pick-up or delivery - cash, check, or card accepted)     Able to afford home medications/ co-pay costs: Yes    ADLS:  Current PT AM-PAC Score: 14 /24  Current OT AM-PAC Score: 14 /24      DISCHARGE Disposition: Inpatient Rehab: [facility] [address] Phone: [phone] Fax: [phone]    LOC at discharge: Not Applicable  MURIEL Completed: Yes    Notification completed in HENS/PAS?:  Not Applicable    IMM Completed:   Not Indicated    Transportation:  Transportation PLAN for discharge: EMS transportation   Mode of Transport: Ambulance stretcher - BLS  Reason for medical transport: Severe muscular weakness and de-conditioned state due to CHF and requires ambulance transport due to needs O2  Name of [address] [place] [address], [address]: [facility]  Phone: [phone]  Time of Transport: 9:15pm    Transport form completed: Yes    Home Care:  Home Care ordered at discharge: Not [address]: Not Applicable  Orders faxed: No    Durable Medical Equipment:  DME Provider: n/a  Equipment obtained during hospitalization: n/a    Home Oxygen and Respiratory Equipment:  Oxygen needed at discharge?: Yes  [facility]: [facility]  Phone: [phone]   Portable tank available for discharge?: Not Indicated      Additional CM Notes:   Patient will discharge to [facility] [address] Orders faxed to [phone], nurse to call report to [phone]. Patient scheduled for transport at 9:15pm via [address] Patient aware and agreeable to plan. The Plan for Transition of Care is related to the following treatment goals of Right shoulder pain, unspecified chronicity [M25.511]  S/P reverse total shoulder arthroplasty, right [Z96.611]    The Patient and/or patient representative Alec Jerome and her family were provided with a choice of provider and agrees with the discharge plan Yes    Freedom of choice list was provided with basic dialogue that supports the patient's individualized plan of care/goals and shares the quality data associated with the providers.  Yes    Care Transitions patient: No    Husam Ace RN  [facility]  Case Management Department  Ph: [phone]  Fax: [phone]

## 2022-12-19 NOTE — PROGRESS NOTES
Pt A/Ox4. VSS. Pain controlled with PO meds per MAR. Dressing CDI, Immobilizer in place. Ice applied. Neurovascular checks remain unchanged. Voiding adequately. Safety precautions in place. Will continue to monitor.

## 2022-12-19 NOTE — PLAN OF CARE

## 2022-12-20 ENCOUNTER — TELEPHONE (OUTPATIENT)
Dept: ORTHOPEDIC SURGERY | Age: 77
End: 2022-12-20

## 2022-12-20 ENCOUNTER — OFFICE VISIT (OUTPATIENT)
Dept: ORTHOPEDIC SURGERY | Age: 77
End: 2022-12-20

## 2022-12-20 VITALS — WEIGHT: 202 LBS | HEIGHT: 65 IN | BODY MASS INDEX: 33.66 KG/M2

## 2022-12-20 DIAGNOSIS — Z96.611 S/P REVERSE TOTAL SHOULDER ARTHROPLASTY, RIGHT: ICD-10-CM

## 2022-12-20 DIAGNOSIS — M25.511 RIGHT SHOULDER PAIN, UNSPECIFIED CHRONICITY: Primary | ICD-10-CM

## 2022-12-20 PROCEDURE — 99024 POSTOP FOLLOW-UP VISIT: CPT | Performed by: PHYSICIAN ASSISTANT

## 2022-12-20 NOTE — PROGRESS NOTES
Patient discharged to [facility] via stretcher and ambulance with all documented belongings at 2216.     Electronically signed by Eun Teran RN on 12/19/2022 at 10:37 PM

## 2022-12-20 NOTE — TELEPHONE ENCOUNTER
[facility] WOULD LIKE A CALL BACK TO WORK THE PATIENT INTO THE [place] LOCATION.  [address] [facility] CAN ASK FOR MERLYN OR Alexandra Blazing

## 2022-12-20 NOTE — PROGRESS NOTES
History of Present Illness:  Bradly Méndez is a 68 y.o. female who presents for a post operative visit. The patient underwent a underwent a right Right shoulder arthrotomy, open biceps tenodesis, reverse total shoulder replacement on 122/13/2022 by Dr. Cristina Colvin. She is in a fair amount of pain and is taking her pain medication every 6hrs hours. She has been compliant with wearing the UltraSling brace at all times. She is recovering at a rehab facility. She presents in a wheelchair. The patient deny fevers, chills, numbness, tingling, and shortness of breath. Medical History:  Patient's medications, allergies, past medical, surgical, social and family histories were reviewed and updated as appropriate. No notes on file    Review of Systems  A 14 point review of systems was completed by the patient is available in the media section of the scanned medical record and was reviewed on 12/20/2022. Vital Signs: There were no vitals filed for this visit. General/Appearance: Alert and oriented and in no apparent distress. Skin:  There are no skin lesions, cellulitis, or extreme edema. The patient has warm and well-perfused Bilateral upper extremities with brisk capillary refill.      right Shoulder Exam:    Inspection: right shoulder incision that is clean, dry and intact and well approximated. The Prineo dressing is still in place. Mild ecchymosis and swelling are present as can be expected. There is no erythema, drainage or other signs of infection    Palpation:  No crepitus to gentle motion    Active Range of Motion: Deferred    Passive Range of Motion:  tolerates pendulum movement. Strength:  Deferred    Special Tests:  Deferred.     Neurovascular: Sensation to light touch is intact, no motor deficits, palpable radial pulses 2+    Radiology:     Plain radiographs of the right shoulder comprising 3 views: ap in and out and axillary lateral were obtained and reviewed in the office: Shows postsurgical changes from the reverse total shoulder replacement. All the components are in good placement without any signs of loosening, fractures, subluxations or dislocations. Impression: Stable postop x-ray. Assessment :  Ms. Jannie Pacheco is a 68 y.o. patient Right shoulder arthrotomy, open biceps tenodesis, reverse total shoulder replacement. Surgery on 12/13/2022. Impression:  Encounter Diagnoses   Name Primary? Right shoulder pain, unspecified chronicity Yes    S/P reverse total shoulder arthroplasty, right        Office Procedures:  Orders Placed This Encounter   Procedures    XR SHOULDER RIGHT (MIN 2 VIEWS)     Standing Status:   Future     Number of Occurrences:   1     Standing Expiration Date:   12/15/2023     Order Specific Question:   Reason for exam:     Answer:   right shoulder pain       Treatment Plan:    Overall the patient is doing well. The pain is well-controlled. She is to be strict non weight bearing with the right upper extremity with transfers. We recommend that she wear the UltraSling brace at all times with the exception of clothing, bathing of physical therapy. The patient was told that she is restricted from driving for at least 3 weeks postop. We will give a print out of their physical therapy order. Please continue to ice the shoulder and arm. She is work with PT. All of her questions were fully answered today. We would like to see her back in 2 weeks for follow-up visit. 45 Klaudia Degroot PA-C  Orthopaedic Sports Medicine Physician Assistant      This dictation was performed with a verbal recognition program [facility]) and it was checked for errors. It is possible that there are still dictated errors within this office note. If so, please bring any errors to my attention for an addendum. All efforts were made to ensure that this office note is accurate.

## 2022-12-20 NOTE — PROGRESS NOTES
Patient is A&O x4.  O2 2L, sat 97%. No complaints of SOB. Medicated for c/o right shoulder pain as needed. Respirations appear to easy and unlabored. Lungs clear. Respirations easy with no complaints of cough. No complaints of nausea/vomiting/diarrhea. Up with assistance to the bathroom/BSC as needed. IV removed already for discharge. Tolerating regular diet. Plan of care and safety measures reviewed with the patient. Call light in reach and bed alarm in place. Will continue to monitor.   Electronically signed by David Garcia RN on 12/19/2022 at 10:36 PM

## 2022-12-22 ENCOUNTER — TELEPHONE (OUTPATIENT)
Dept: ORTHOPEDIC SURGERY | Age: 77
End: 2022-12-22

## 2022-12-22 NOTE — TELEPHONE ENCOUNTER
Other BILL A PHYSICAL THERAPIST WITH [facility] [employer] IS CALLING REQUESTING A CALL BACK. SHE IS WANTING DETAIL INSTRUCTIONS ON WHAT SHE SHOULD BE DOING WITH THE PATIENT.  [facility] [phone]

## 2023-01-10 ENCOUNTER — TELEPHONE (OUTPATIENT)
Dept: ORTHOPEDIC SURGERY | Age: 78
End: 2023-01-10

## 2023-01-11 ENCOUNTER — TELEPHONE (OUTPATIENT)
Dept: ORTHOPEDIC SURGERY | Age: 78
End: 2023-01-11

## 2023-01-11 DIAGNOSIS — Z96.611 S/P REVERSE TOTAL SHOULDER ARTHROPLASTY, RIGHT: Primary | ICD-10-CM

## 2023-01-11 NOTE — TELEPHONE ENCOUNTER
Medical Facility Question      Facility Name: [address] [place] [address] [address]    Contact Name: Pat Jose Number: [phone]    Request or Information:   PATIENT PHYSICAL THERAPY IS NEEDING THE PATIENTS SHOULDER PROTOCOL. PLEASE ADVISE.      Shimon MEADE BACK AT  [phone]

## 2023-01-11 NOTE — TELEPHONE ENCOUNTER
Medical Facility Question     Facility Name: [address] [facility] [address]  Contact Name: José Miguel Ramirez Number: [phone]   Request or Information: PATIENTS PHYSICAL THERAPY IS NEEDING THE PATIENTS SHOULDER PROTOCOL. PLEASE ADVISE.

## 2023-01-17 ENCOUNTER — OFFICE VISIT (OUTPATIENT)
Dept: ORTHOPEDIC SURGERY | Age: 78
End: 2023-01-17

## 2023-01-17 VITALS — BODY MASS INDEX: 33.66 KG/M2 | HEIGHT: 65 IN | WEIGHT: 202 LBS

## 2023-01-17 DIAGNOSIS — M25.511 RIGHT SHOULDER PAIN, UNSPECIFIED CHRONICITY: Primary | ICD-10-CM

## 2023-01-17 DIAGNOSIS — Z96.611 S/P REVERSE TOTAL SHOULDER ARTHROPLASTY, RIGHT: ICD-10-CM

## 2023-01-17 PROCEDURE — 99024 POSTOP FOLLOW-UP VISIT: CPT | Performed by: PHYSICIAN ASSISTANT

## 2023-01-17 NOTE — PROGRESS NOTES
History of Present Illness:  Rafy Pa is a 68 y.o. female who presents for a post operative visit. The patient underwent a right reverse total shoulder arthroplasty on 12/13/2020. This patient has continued to noon to be convalescing at a nursing home facility. She presents today with her family members. The patient and her family members feel that they are not working on her physical therapy as much as they would have helped. Patient reports that when she is able to start weightbearing she may be [facility] where they feel that the physical therapy is a bit better. She denies any injuries. The patient deny fevers, chills, numbness, tingling, and shortness of breath. Medical History:  Patient's medications, allergies, past medical, surgical, social and family histories were reviewed and updated as appropriate. Review of Systems  A 14 point review of systems was completed by the patient is available in the media section of the scanned medical record and was reviewed on 1/17/2023. Vital Signs: There were no vitals filed for this visit. General/Appearance: Alert and oriented and in no apparent distress. Skin:  There are no skin lesions, cellulitis, or extreme edema. The patient has warm and well-perfused Bilateral upper extremities with brisk capillary refill.      right Shoulder Exam:    Inspection: right shoulder incision that is clean, dry and intact and well approximated. The Prineo dressing is still in place. Mild ecchymosis and swelling are present as can be expected. There is no erythema, drainage or other signs of infection    Palpation:  No crepitus to gentle motion    Active Range of Motion: Forward elevation to 80 degrees, external rotation of 10 to  Passive Range of Motion: Forward Elevation of 120    Strength:  Deferred    Special Tests:  Deferred.     Neurovascular: Sensation to light touch is intact, no motor deficits, palpable radial pulses 2+    Radiology: Plain radiographs of the right shoulder comprising 3 views: AP in and out axillary lateral were obtained and reviewed in the office: Shows postsurgical changes from the reverse total shoulder replacement. All the components are in good placement without any signs of loosening, fractures, subluxations or dislocations. Impression: Stable postop x-ray. Assessment :  Ms. Alexandro Ny is a 68 y.o. patient who is 5 weeks status post from a right reverse total shoulder arthroplasty      Impression:  Encounter Diagnoses   Name Primary? Right shoulder pain, unspecified chronicity Yes    S/P reverse total shoulder arthroplasty, right        Office Procedures:  Orders Placed This Encounter   Procedures    XR SHOULDER RIGHT (MIN 2 VIEWS)     Standing Status:   Future     Number of Occurrences:   1     Standing Expiration Date:   1/17/2024     Order Specific Question:   Reason for exam:     Answer:   right shoulder pain       Treatment Plan:    Overall the patient is doing well. The pain is well-controlled. Patient may start to weight-bear when she is 6 weeks out and this would be the earliest.  She can hold off until 8 weeks that would be even better. She may want to start using a platform walker if possible. Patient was given 2 sets of physical therapy orders. This is so that she can be progressed past 6 weeks postop. We will give a print out of their physical therapy order. All of her questions were fully answered today. We would like to see her back in 4 weeks for follow-up visit. 2:53 PM    Lydia Mcnamara PA-C  Orthopaedic Sports Medicine Physician Assistant      This dictation was performed with a verbal recognition program [facility]) and it was checked for errors. It is possible that there are still dictated errors within this office note. If so, please bring any errors to my attention for an addendum. All efforts were made to ensure that this office note is accurate.

## 2023-01-17 NOTE — LETTER
Physical Therapy Rehabilitation Referral    Patient Name:  Haylie Bethea      YOB: 1945    Diagnosis:    1. Right shoulder pain, unspecified chronicity    2. S/P reverse total shoulder arthroplasty, right        Precautions: subscapularis     [x] Evaluate and Treat    Post Op Instructions:  [] Continuous passive motion (CPM)  [x] Elbow ROM  [x] Exercise in plane of scapula  [x]  Strengthening     [x] Pulley and instruction   [x] Home exercise program (copy to patient)   [] Sling when arm at risk  [] Sling or brace at all times   [x] AAROM: Forward elevation to  140            [x] AAROM: External rotation  To  40    [] Isometric external rotator strengthening [x] AAROM: internal rotation: up the back  [x] Isometric abductor strengthening  [x] AAROM: Internal abduction   [] Isometric internal rotator strengthening [x] AAROM: cross-body adduction             Stretching:     Strengthening:  [] Four quadrant (FE, ER, IR, CBA)  [] Rotator cuff (ER, IR, Abd)  [] Forward Elevation    [] External Rotators     [] External Rotation    [] Internal Rotators  [] Internal Rotation: up/back   [] Abductors     [] Internal Rotation: supine in abduction  [] Sleeper Stretch    [] Flexors  [] Cross-body abduction    [] Extensors  [x] Pendulum (FE, Abd/Add, cw/ccw)  [x] Scapular Stabilizers   [x] Wall-walking (FE, Abd)        [x] Shoulder shrugs     [x] Table slides (FE)                [x] Rhomboid pinch  [] Elbow (flex, ext, pron, sup)        [] Lat.  Pull downs     [] Medial epicondylitis program       [] Forward punch   [] Lateral epicondylitis program       [] Internal rotators     [] Progressive resistive exercises  [] Bench Press        [] Bench press plus  Activities:     [] Lateral pull-downs  [] Rowing     [x] Progressive two-hand supine press  [] Stepper/Exercise bike   [x] Biceps: curls/supination  [] Swimming  [] Water exercises    Modalities:     Return to Sport:  [x] Of Choice      [] Plyometrics  [] Ultrasound     [] Rhythmic stabilization  [] Iontophoresis    [] Core strengthening   [] Moist heat     [] Sports specific program:   [] Massage         [x] Cryotherapy      [] Electrical stimulation     [] Paraffin  [] Whirlpool  [] TENS    [x] Home exercise program (copy to patient). Perform exercises for:   15     minutes    3      times/day  [x] Supervised physical therapy  Frequency: []  1x week  [x] 2x week  [] 3x week  [] Other:   Duration: [] 2 weeks   [] 4 weeks  [x] 6 weeks  [] Other:     Additional Instructions: please work on progressive incline deltoid strengthening. She may start to weight bear as tolerated at 6 weeks post op. She should start with a platform walker to ambulate. She can discontinue the sling completely at 6 weeks post op    Valeria Montilla MD, PhD

## 2023-01-17 NOTE — LETTER
04/06/21 0714   Dual Skin Pressure Injury Assessment   Dual Skin Pressure Injury Assessment WDL   Second Care Provider (Based on [address] [place] [address]) Sigifredo Roberson RN   Skin Integumentary   Skin Integumentary (WDL) WDL    Pressure  Injury Documentation No Pressure Injury Noted-Pressure Ulcer Prevention Initiated   Skin Color Appropriate for ethnicity   Skin Condition/Temp Dry; Warm   Skin Integrity Intact   Turgor Non-tenting   Wound Prevention and Protection Methods   Orientation of Wound Prevention Posterior   Location of Wound Prevention Sacrum/Coccyx   Dressing Present  No   Wound Offloading (Prevention Methods) Bed, pressure reduction mattress;Pillows;Repositioning        04/06/21 0714   Dual Skin Pressure Injury Assessment   Dual Skin Pressure Injury Assessment WDL   Second Care Provider (Based on Facility Policy) Sigifredo Roberson RN   Skin Integumentary   Skin Integumentary ([facility]) [facility]    Pressure  Injury Documentation No Pressure Injury Noted-Pressure Ulcer Prevention Initiated   Skin Color Appropriate for ethnicity   Skin Condition/Temp Dry; Warm   Skin Integrity Intact   Turgor Non-tenting   Wound Prevention and Protection Methods   Orientation of Wound Prevention Posterior   Location of Wound Prevention Sacrum/Coccyx   Dressing Present  No   Wound Offloading (Prevention Methods) Bed, pressure reduction mattress;Pillows;Repositioning Physical Therapy Rehabilitation Referral    Patient Name:  Courtney Webster      YOB: 1945    Diagnosis:    1. Right shoulder pain, unspecified chronicity    2. S/P reverse total shoulder arthroplasty, right        Precautions: subscapularis     [x] Evaluate and Treat    Post Op Instructions:  [] Continuous passive motion (CPM)  [x] Active Elbow ROM  [x] Exercise in plane of scapula  []  Strengthening     [x] Pulley and instruction   [x] Home exercise program (copy to patient)   [] Sling when arm at risk  [] Sling or brace at all times   [x] AAROM: Forward elevation to  140            [x] AAROM: External rotation  To  40    [] Isometric external rotator strengthening [] AAROM: internal rotation: up the back  [x] Isometric abductor strengthening  [] AAROM: Internal abduction   [] Isometric internal rotator strengthening [] AAROM: cross-body adduction             Stretching:     Strengthening:  [] Four quadrant (FE, ER, IR, CBA)  [] Rotator cuff (ER, IR, Abd)  [] Forward Elevation    [] External Rotators     [] External Rotation    [] Internal Rotators  [] Internal Rotation: up/back   [] Abductors     [] Internal Rotation: supine in abduction  [] Sleeper Stretch    [] Flexors  [] Cross-body abduction    [] Extensors  [x] Pendulum (FE, Abd/Add, cw/ccw)  [x] Scapular Stabilizers   [x] Wall-walking (FE, Abd)        [x] Shoulder shrugs     [x] Table slides (FE)                [x] Rhomboid pinch  [] Elbow (flex, ext, pron, sup)        [] Lat.  Pull downs     [] Medial epicondylitis program       [] Forward punch   [] Lateral epicondylitis program       [] Internal rotators     [] Progressive resistive exercises  [] Bench Press        [] Bench press plus  Activities:     [] Lateral pull-downs  [] Rowing     [] Progressive two-hand supine press  [] Stepper/Exercise bike   [] Biceps: curls/supination  [] Swimming  [] Water exercises    Modalities:     Return to Sport:  [x] Of Choice      [] Plyometrics  [] Ultrasound     [] Rhythmic stabilization  [] Iontophoresis    [] Core strengthening   [] Moist heat     [] Sports specific program:   [] Massage         [x] Cryotherapy      [] Electrical stimulation     [] Paraffin  [] Whirlpool  [] TENS    [x] Home exercise program (copy to patient). Perform exercises for:   15     minutes    3      times/day  [x] Supervised physical therapy  Frequency: []  1x week  [x] 2x week  [] 3x week  [] Other:   Duration: [] 2 weeks   [] 4 weeks  [x] 6 weeks  [] Other:     Additional Instructions: to be followed until she is 6 weeks post.  Non weight bearing until 6 weeks post op. Valerai Lacey MD, PhD

## 2023-02-03 ENCOUNTER — TELEPHONE (OUTPATIENT)
Dept: ORTHOPEDIC SURGERY | Age: 78
End: 2023-02-03

## 2023-02-09 ENCOUNTER — OFFICE VISIT (OUTPATIENT)
Dept: ORTHOPEDIC SURGERY | Age: 78
End: 2023-02-09

## 2023-02-09 VITALS — BODY MASS INDEX: 33.66 KG/M2 | HEIGHT: 65 IN | WEIGHT: 202 LBS

## 2023-02-09 DIAGNOSIS — Z96.611 S/P REVERSE TOTAL SHOULDER ARTHROPLASTY, RIGHT: Primary | ICD-10-CM

## 2023-02-09 PROCEDURE — 99024 POSTOP FOLLOW-UP VISIT: CPT | Performed by: ORTHOPAEDIC SURGERY

## 2023-02-09 NOTE — PROGRESS NOTES
Chief Complaint    Post-Op Check (Right Shoulder)      History of Present Illness:  Jo Obregon is a pleasant, 68 y.o., female, here today for follow up of her right shoulder. This patient is 8 weeks s/p right reverse total shoulder replacement on 12/13/22. She has continued in physical therapy at [facility]. However, her PT visits will run out this week and she will plan to have home health come to her for continued therapy. Her pain levels are minimal. She is pleased with her recovery thus far. She reports no new injuries or setbacks. Medical History:  Patient's medications, allergies, past medical, surgical, social and family histories were reviewed and updated as appropriate. No notes on file    Review of Systems  A 14 point review of systems was completed by the patient and is available in the media section of the scanned medical record and was reviewed on 2/9/2023. The review is negative with the exception of those things mentioned in the HPI and Past Medical History    Vital Signs: There were no vitals filed for this visit. General/Appearance: Alert and oriented and in no apparent distress. Skin:  There are no skin lesions, cellulitis, or extreme edema. The patient has warm and well-perfused Bilateral upper extremities with brisk capillary refill. Right Shoulder Exam:  Inspection: Incision is healing well. No gross deformities, no signs of infection. Palpation: No crepitus    Active Range of Motion: Forward Elevation 100, External Rotation 20    Passive Range of Motion: Forward Elevation 110 with some discomfort    Strength:  Deferred    Special Tests: No Art muscle deformity. Neurovascular: Sensation to light touch is intact, no motor deficits, palpable radial pulses 2+      Radiology:     No new XR obtained at this time.          Assessment :  Ms. Jo Obregon is a pleasant, 68 y.o. patient who is 8 weeks s/p right reverse total shoulder replacement on 12/13/22. Impression:  Encounter Diagnosis   Name Primary? S/P reverse total shoulder arthroplasty, right Yes       Office Procedures:  Orders Placed This Encounter   Procedures    Amb External Referral To Physical Therapy     Referral Priority:   Routine     Referral Type:   Consult for Advice and Opinion     Referral Reason:   Patient Preference     Requested Specialty:   Orthopedic Physical Therapist     Number of Visits Requested:   1         Treatment Plan:  Padmini Devine is doing well in her recovery. We recommend this patient continue in physical therapy. A new physical therapy letter was documented in [facility] today. We will have her continue to work on motion. She may now use the arm to bear weight as needed. We will see Alana Helms back in 6 weeks and/or as needed. All questions were answered to patient's satisfaction and She was encouraged to call with any further questions or concerns. Padmini Devine is in agreement with this plan. 2/9/2023  2:06 PM    Alie Hawthorne ATC  [facility] [address] [facility]    During this examination, I, Rajeshtiti Rodriguezr, functioned as a scribe for Dr. Young Arzate. The history taking and physical examination were performed by Dr. Luis Butler. All counseling during the appointment was performed between the patient and Dr. Luis Butler. 2/9/23  ______________  I, Dr. Young Arzate, personally performed the services described in this documentation as described by Alie Hawthorne ATC in my presence, and it is both accurate and complete. Valeria Butler MD, PhD  2/9/2023

## 2023-02-09 NOTE — LETTER
Physical Therapy Rehabilitation Referral    Patient Name:  Lynn Devries      YOB: 1945    Diagnosis:    1. S/P reverse total shoulder arthroplasty, right        Precautions: subscapularis     [x] Evaluate and Treat    Post Op Instructions:  [] Continuous passive motion (CPM)  [x] Elbow ROM  [x] Exercise in plane of scapula  [x]  Strengthening     [x] Pulley and instruction   [x] Home exercise program (copy to patient)   [] Sling when arm at risk  [] Sling or brace at all times   [x] AAROM: Forward elevation to  140            [x] AAROM: External rotation  To  40    [] Isometric external rotator strengthening [x] AAROM: internal rotation: up the back  [x] Isometric abductor strengthening  [x] AAROM: Internal abduction   [] Isometric internal rotator strengthening [x] AAROM: cross-body adduction             Stretching:     Strengthening:  [] Four quadrant (FE, ER, IR, CBA)  [] Rotator cuff (ER, IR, Abd)  [] Forward Elevation    [] External Rotators     [] External Rotation    [] Internal Rotators  [] Internal Rotation: up/back   [] Abductors     [] Internal Rotation: supine in abduction  [] Sleeper Stretch    [] Flexors  [] Cross-body abduction    [] Extensors  [x] Pendulum (FE, Abd/Add, cw/ccw)  [x] Scapular Stabilizers   [x] Wall-walking (FE, Abd)        [x] Shoulder shrugs     [x] Table slides (FE)                [x] Rhomboid pinch  [] Elbow (flex, ext, pron, sup)        [] Lat.  Pull downs     [] Medial epicondylitis program       [] Forward punch   [] Lateral epicondylitis program       [] Internal rotators     [] Progressive resistive exercises  [] Bench Press        [] Bench press plus  Activities:     [] Lateral pull-downs  [x] Rowing     [x] Progressive two-hand supine press  [] Stepper/Exercise bike   [x] Biceps: curls/supination  [] Swimming  [] Water exercises    Modalities:     Return to Sport:  [x] Of Choice      [] Plyometrics  [] Ultrasound     [] Rhythmic stabilization  [] Iontophoresis    [] Core strengthening   [] Moist heat     [] Sports specific program:   [] Massage         [x] Cryotherapy      [] Electrical stimulation     [] Paraffin  [] Whirlpool  [] TENS    [x] Home exercise program (copy to patient). Perform exercises for:   15     minutes    3      times/day  [x] Supervised physical therapy  Frequency: []  1x week  [x] 2x week  [] 3x week  [] Other:   Duration: [] 2 weeks   [] 4 weeks  [x] 6 weeks  [] Other:     Additional Instructions:   She may now weight bear through her right arm  Continue to work on [employer] and AROM   Progressive incline deltoid strengthening program.     Valeria Rodriguez MD, PhD

## 2023-03-16 ENCOUNTER — OFFICE VISIT (OUTPATIENT)
Dept: ORTHOPEDIC SURGERY | Age: 78
End: 2023-03-16
Payer: MEDICARE

## 2023-03-16 VITALS — HEIGHT: 65 IN | WEIGHT: 202 LBS | BODY MASS INDEX: 33.66 KG/M2

## 2023-03-16 DIAGNOSIS — Z96.611 STATUS POST REVERSE ARTHROPLASTY OF SHOULDER, RIGHT: Primary | ICD-10-CM

## 2023-03-16 PROCEDURE — 99213 OFFICE O/P EST LOW 20 MIN: CPT | Performed by: ORTHOPAEDIC SURGERY

## 2023-03-16 PROCEDURE — 1123F ACP DISCUSS/DSCN MKR DOCD: CPT | Performed by: ORTHOPAEDIC SURGERY

## 2023-03-16 RX ORDER — METOPROLOL TARTRATE 50 MG/1
TABLET, FILM COATED ORAL
COMMUNITY
Start: 2023-03-08

## 2023-03-16 RX ORDER — HYDROXYZINE PAMOATE 25 MG/1
CAPSULE ORAL
COMMUNITY
Start: 2023-02-13

## 2023-03-16 RX ORDER — HYDRALAZINE HYDROCHLORIDE 10 MG/1
TABLET, FILM COATED ORAL
COMMUNITY
Start: 2023-02-13

## 2023-03-16 RX ORDER — OXYCODONE AND ACETAMINOPHEN 7.5; 325 MG/1; MG/1
TABLET ORAL
COMMUNITY
Start: 2023-02-13

## 2023-03-16 NOTE — PROGRESS NOTES
Exam:  There were no vitals filed for this visit. General: Linda Mohr is a healthy and well appearing 66 y.o. female who is sitting comfortably in our office in acute distress. Skin:  There are no skin lesions, cellulitis, or extreme edema. The patient has warm and well-perfused Bilateral upper extremities with brisk capillary refill. Eyes: Extra-ocular muscles intact  Mouth: Oral mucosa moist. No perioral lesions  Pulm: Respirations unlabored and regular. Neuro: Alert and oriented x3    Right shoulder Exam:  Inspection:  No gross deformities, no signs of infection. Palpation:  There is no crepitus. Non-tender to palpation. Active Range of Motion: Forward elevation of 130, abduction of 130, external rotation with elbow at the side 25, internal rotation to the back is L5    Passive Range of Motion: Passively forward elevation can be further increased to 150. Strength: External rotation with resistance with elbow at the side 4/5, internal rotation with resistance with elbow at the side 4+/5, Champagne toast testing 4-/5, Jobes test 4-/5    Neurovascular: Sensation to light touch is intact, no motor deficits, palpable radial pulses 2+    Additional Examinations:    Examination of the contralateral extremity does not show any tenderness, deformity or injury. Range of motion is unremarkable. There is no gross instability. There are no rashes, ulcerations or lesions. Strength and tone are normal.      Radiographic:  No new images. Assessment:  Linda Mohr is a 66 y.o. female s/p R rTSA on 12/13/23, doing well. Impression:  Encounter Diagnosis   Name Primary? Status post reverse arthroplasty of shoulder, right Yes       Office Procedures:  No orders of the defined types were placed in this encounter. Plan:   Mary Dial is doing well, she has graduated from PT. Her shoulder ROM is very good and symptoms are significantly improved compared to prior to surgery.  We discussed her total recovery period

## 2024-04-04 ENCOUNTER — OFFICE VISIT (OUTPATIENT)
Dept: ORTHOPEDIC SURGERY | Age: 79
End: 2024-04-04
Payer: MEDICARE

## 2024-04-04 VITALS — HEIGHT: 65 IN | BODY MASS INDEX: 33.66 KG/M2 | WEIGHT: 202 LBS

## 2024-04-04 DIAGNOSIS — Z96.611 S/P REVERSE TOTAL SHOULDER ARTHROPLASTY, RIGHT: ICD-10-CM

## 2024-04-04 DIAGNOSIS — M25.511 RIGHT SHOULDER PAIN, UNSPECIFIED CHRONICITY: Primary | ICD-10-CM

## 2024-04-04 DIAGNOSIS — S40.011A CONTUSION OF RIGHT SHOULDER, INITIAL ENCOUNTER: ICD-10-CM

## 2024-04-04 PROCEDURE — 99214 OFFICE O/P EST MOD 30 MIN: CPT | Performed by: ORTHOPAEDIC SURGERY

## 2024-04-04 PROCEDURE — 1123F ACP DISCUSS/DSCN MKR DOCD: CPT | Performed by: ORTHOPAEDIC SURGERY

## 2024-04-04 RX ORDER — BUMETANIDE 2 MG/1
2 TABLET ORAL DAILY
COMMUNITY
Start: 2024-03-29

## 2024-04-09 NOTE — PROGRESS NOTES
History of Present Illness:  Melina Bajwa returns for follow-up of her right shoulder. She is now 16 months out following right reverse shoulder arthroplasty. She had been doing well with her shoulder until a recent injury. Her feet got caught in something while trying to get around a door and she fell. The day of this injury was 3/26/24. She has been complaining of more anterior shoulder soreness. She denies any clunking or grinding.    She has had a recent long hospitalization for pneumonia. She was hospitalized at [facility] for about a month. She has chronic COPD and uses a portable oxygenator. She ambulates with a modified walker using both arms. She is still able to do this. She is accompanied by a grand daughter.       Medical History:  Patient's medications, allergies, past medical, surgical, social and family histories were reviewed and updated as appropriate.    Pertinent items are noted in HPI  Review of systems reviewed from Patient History Form dated on 12/13/22 and available in the patient's chart under the Media tab.     Vital Signs:  There were no vitals filed for this visit.  Constitutional: in no distress.      Right Shoulder Examination:    Inspection:  WHSS. No signs of infectino.    Palpation:  no GH or SA crepitus. Tender over tuberosity. NT over spine of scapula.    Active Range of Motion: 120/90/10/-    Passive Range of Motion:  passive FE to 130 and passive ER to 20.    Strength:  3/5 ER, slight lag. Deltoid 4/5    Special Tests:  distal NVI intact.    Self assessment questionnaires were completed today.     Radiology:     Plain radiographs of the right shoulder comprising 3 views: true AP int/ext and axillary lateral were obtained and reviewed in the office: these show no fracture. RSA in good position without bone or implant related complications.    Impression: s/p right RSA doing well at 1 1/2 years. Probable resolving contusion.         Assessment : We reassured Melina Bajwa that she has

## 2024-09-24 NOTE — PROGRESS NOTES
Occupational Therapy  Facility/Department: [facility] 5T ORTHO/NEURO  Occupational Therapy Initial Assessment and Treatment Note     Name: Jayne Levin  : 1945  MRN: [id number]  Date of Service: 2022    Discharge Recommendations: Jayne Levin scored a  on the AM-PAC ADL Inpatient form. Current research shows that an AM-PAC score of 17 or less is typically not associated with a discharge to the patient's home setting. Based on the patient's AM-PAC score and their current ADL deficits, it is recommended that the patient have 3-5 sessions per week of Occupational Therapy at d/c to increase the patient's independence. Please see assessment section for further patient specific details. If patient discharges prior to next session this note will serve as a discharge summary. Please see below for the latest assessment towards goals. OT Equipment Recommendations  Equipment Needed: No  Other: defer to [facility] care facility       Patient Diagnosis(es): The encounter diagnosis was Rotator cuff arthropathy of right shoulder. Past Medical History:  has a past medical history of A-fib ([address] Arthritis, Asthma, CAD (coronary artery disease), Cancer ([address] CHF (congestive heart failure) ([address] COPD (chronic obstructive pulmonary disease) ([address] CPAP, Depression, Hyperlipidemia, Hypertension, Paralysis of diaphragm, and Thyroid disease. Past Surgical History:  has a past surgical history that includes Hysterectomy, total abdominal; Ankle surgery (Right); Mastectomy (Right); Tonsillectomy; Cholecystectomy, laparoscopic; and shoulder surgery (Right, 2022). Treatment Diagnosis: impaired ADLs / functional mobility 2/2 R TSA / R shoulder immobilizer      Assessment   Performance deficits / Impairments: Decreased functional mobility ; Decreased ADL status; Decreased endurance  Assessment: Pt from home alone - uses Rollator / has assist with IADLs / bathing /dressing at baseline.  Pt demo needing increased assist with functional mobility ( Mod assist x 2). Pt limited with all ADLs 2/2 dominant RUE in shoulder immobilizer. Limited by poor endurance 2/2 COPD/ O2 needs and pain. Pt would benefit from ongoing inpt OT at d/c to maximize functional level. Will follow as inpt  Treatment Diagnosis: impaired ADLs / functional mobility 2/2 R TSA / R shoulder immobilizer  Prognosis: Fair  Decision Making: Medium Complexity  REQUIRES OT FOLLOW-UP: Yes  Activity Tolerance  Activity Tolerance: Patient limited by fatigue;Patient limited by pain  Activity Tolerance Comments: Pt needing freq seated break - slight PEREZ on 3L o2 ( baseline 2L o2)        Plan   Occupational Therapy Plan  Times Per Week: 7x  Times Per Day: Once a day  Current Treatment Recommendations: Safety education & training, Self-Care / ADL, Patient/Caregiver education & training, Endurance training, Functional mobility training     Restrictions  Position Activity Restriction  Other position/activity restrictions: NWB R UE, shoulder immobilizer    Subjective   General  Chart Reviewed: Yes  Additional Pertinent Hx: Admit 12/13  s/p R TSA                                                        PMHX: Arthritis,  Asthma, CAD, CHF, COPD w/ chronic resp failure on 2 liters of o2 by nc at baseline , Depression , Hypertension and  Thyroid disease  Family / Caregiver Present: No  Diagnosis: s/p R TSA  Subjective  Subjective: \" I hurt all over\" pt found resting in bed - R sided lean in bed - Pt agreeable for OOB/OT eval and tx.   General Comment  Comments: Pt's shoulder immobilizer not on correctly- SI adjusted and fit correctly to RUE's  8-9/10 shoulder pain, RN aware and pt just had pain meds    Social/Functional History  Social/Functional History  Lives With: Alone  Type of Home: Senior housing apartment  Home Layout: One level  Home Access: Level entry  Bathroom Shower/Tub: Tub/Shower unit  Bathroom Toilet: Standard (toilet safety frame)  Bathroom Equipment: Tub transfer bench, Grab bars in shower (pull cord in bathroom)  Home Equipment: Oxygen, Lift chair, Alert Button (Upright 4 wheeled walker, 2L Home O2)  Has the patient had two or more falls in the past year or any fall with injury in the past year?: No (1 fall in April- resulted in broken ribs)  ADL Assistance: Needs assistance (HHA 2x week for showers, Pt struggles with dressing)  Homemaking Assistance: Needs assistance (Daughter-in-law does laundry, cleaning and grocery shopping. Pt makes microwave meals)  Ambulation Assistance: Independent (Upright 4 wheeled walker)  Transfer Assistance: Independent  Active : No  Additional Comments: Pt hoping to go to [facility]rooms in [facility]       Objective Treatment included functional transfer training, ADL's and pt. education. Safety Devices  Type of Devices: Left in chair;Chair alarm in place;Call light within reach;Nurse notified (Recommend CARIDAD wiley with RN staff)     Toilet Transfers  Toilet - Technique: Stand pivot  Equipment Used: Standard bedside commode (simulated)  Toilet Transfer: Moderate assistance;2 Person assistance;Dependent/Total  Toilet Transfers Comments: using caridad wiley for transfers 2/2 inability to ambulate ( uses high arm rollator at baseline)  AROM: Generally decreased, functional (LUE -- RUE not tested 2/2 in Shoulder Immobilizer)  Strength: Generally decreased, functional  Coordination: Generally decreased, functional  Tone: Normal  Sensation: Intact  ADL  Feeding: Setup; Increased time to complete  Feeding Skilled Clinical Factors: heavy setup with LUE  Grooming: Setup;Minimal assistance  Grooming Skilled Clinical Factors: increased effort- with LUE  UE Dressing: Dependent/Total  UE Dressing Skilled Clinical Factors: to don/ doff Shoulder Immobilizer  LE Dressing: Dependent/Total  LE Dressing Skilled Clinical Factors: with socks  Toileting: Maximum assistance;Dependent/Total  Additional Comments: Pt needing increased assist / time 2/2 RUE ( dominant) in shoulder immobilizer and using LUE for ADLs attempts. Vision  Vision: Within Functional Limits (wears glasses)  Hearing  Hearing: Within functional limits  Cognition  Overall Cognitive Status: [facility]  Cognition Comment: needing increased time with processing 2/2 pain meds  Orientation  Overall Orientation Status: Within Functional Limits       Education Given To: Patient  Education Provided: Role of Therapy;Plan of Care;ADL Adaptive Strategies;Transfer Training;IADL Safety  Education Method: Demonstration;Verbal  Barriers to Learning: None  Education Outcome: Continued education needed    Patient educated on shoulder immobilizer; including purpose, donning/doffing, fit/positioning, and wear schedule as ordered by surgical team.  Patient unable to perform - required Max/ Dep assist with donning / doffing. Will continue with ongoing education.         AM-PAC Score   AM-PAC Inpatient Daily Activity Raw Score: 12 (12/14/22 1150)  AM-PAC Inpatient ADL T-Scale Score : 30.6 (12/14/22 1150)  ADL Inpatient CMS 0-100% Score: 66.57 (12/14/22 1150)  ADL Inpatient CMS G-Code Modifier : CL (12/14/22 1150)    Goals  Short Term Goals  Time Frame for Short Term Goals: at d/c  Short Term Goal 1: Stance x 2 mins with CGA for ADLs / toileting assist  Short Term Goal 2: Functional transfer with CGA and LRAD prn  Short Term Goal 3: Grooming with setup  Short Term Goal 4: Pt/ family demo donning / doffing Shoulder immobilizer with CGA  Patient Goals   Patient goals : Use my RUE again     Therapy Time   Individual Concurrent Group Co-treatment   Time In 1051         Time Out 1132         Minutes 41             Timed Code Treatment Minutes:   24 mins     Total Treatment Minutes:  39 mins       [facility]  show

## 2025-06-12 ENCOUNTER — OFFICE VISIT (OUTPATIENT)
Dept: ORTHOPEDIC SURGERY | Age: 80
End: 2025-06-12
Payer: MEDICARE

## 2025-06-12 VITALS — HEIGHT: 65 IN | BODY MASS INDEX: 33.61 KG/M2

## 2025-06-12 DIAGNOSIS — M25.512 LEFT SHOULDER PAIN, UNSPECIFIED CHRONICITY: ICD-10-CM

## 2025-06-12 DIAGNOSIS — M75.102 LEFT ROTATOR CUFF TEAR ARTHROPATHY: ICD-10-CM

## 2025-06-12 DIAGNOSIS — Z96.611 S/P REVERSE TOTAL SHOULDER ARTHROPLASTY, RIGHT: Primary | ICD-10-CM

## 2025-06-12 DIAGNOSIS — M12.812 LEFT ROTATOR CUFF TEAR ARTHROPATHY: ICD-10-CM

## 2025-06-12 PROCEDURE — 99214 OFFICE O/P EST MOD 30 MIN: CPT | Performed by: ORTHOPAEDIC SURGERY

## 2025-06-12 PROCEDURE — 20610 DRAIN/INJ JOINT/BURSA W/O US: CPT | Performed by: ORTHOPAEDIC SURGERY

## 2025-06-12 PROCEDURE — 1123F ACP DISCUSS/DSCN MKR DOCD: CPT | Performed by: ORTHOPAEDIC SURGERY

## 2025-06-12 PROCEDURE — 1159F MED LIST DOCD IN RCRD: CPT | Performed by: ORTHOPAEDIC SURGERY

## 2025-06-12 PROCEDURE — 1125F AMNT PAIN NOTED PAIN PRSNT: CPT | Performed by: ORTHOPAEDIC SURGERY

## 2025-06-12 RX ORDER — NITROGLYCERIN 0.4 MG/1
0.4 TABLET SUBLINGUAL EVERY 5 MIN PRN
COMMUNITY

## 2025-06-12 RX ORDER — POTASSIUM CHLORIDE 750 MG/1
10 TABLET, EXTENDED RELEASE ORAL 2 TIMES DAILY
COMMUNITY
Start: 2025-03-27

## 2025-06-12 RX ORDER — HYDROCODONE BITARTRATE AND ACETAMINOPHEN 7.5; 325 MG/1; MG/1
TABLET ORAL
COMMUNITY
Start: 2024-09-06

## 2025-06-12 RX ORDER — METOPROLOL TARTRATE 25 MG/1
TABLET, FILM COATED ORAL
COMMUNITY
Start: 2025-05-12

## 2025-06-12 NOTE — PROGRESS NOTES
[facility]  History and Physical  Shoulder Pain    Date:  2025    Name:  Melina Bajwa  Address:  [address] [place] [id number]    :  1945      Age:   80 y.o.    SSN:  [id number]      Medical Record Number:  [id number]    Reason for Visit:    Shoulder Pain (OP/NP LEFT SHOULDER)      HPI:   Melina Bajwa is a 80 y.o. female who presents to our office today complaining of  left shoulder pain.  Of note, patient did undergo right reverse total shoulder arthroplasty with Dr. Red smyth in .  She is doing well in regards to her right shoulder.  However, patient is complaining of significant pain in her left shoulder.  She has very limited range of motion as well as pain at night.  She has not had any specific injury.  She endorses sharp pain as well as grinding sensations.  She has not had any formal physical therapy or injections of any kind.  No numbness or tingling.  Patient is accompanied by her daughter-in-law    Patient does have multiple comorbidities.  Patient has a history of chronic COPD and utilizes a portable oxygenator, she has a history of CAD as well as CHF.  Patient did get a CT scan back in February at [facility] where she was laid supine.  She unfortunately went into cardiac arrest at that time but was resuscitated.    Pain Assessment  Location of Pain: Shoulder  Location Modifiers: Left  Severity of Pain: 8  Quality of Pain: Aching, Dull, Sharp, Throbbing  Duration of Pain: Persistent  Frequency of Pain: Intermittent  Aggravating Factors: Other (Comment), Straightening, Stretching  Limiting Behavior: Yes  Relieving Factors: Rest  Work-Related Injury: No  Are there other pain locations you wish to document?: No    Review of Systems:  A 14 point review of systems available in the scanned medical record as documented by the patient.  The review is negative with the exception of those things mentioned in the History of Present

## 2025-06-13 RX ORDER — METHYLPREDNISOLONE ACETATE 40 MG/ML
80 INJECTION, SUSPENSION INTRA-ARTICULAR; INTRALESIONAL; INTRAMUSCULAR; SOFT TISSUE ONCE
Status: COMPLETED | OUTPATIENT
Start: 2025-06-13 | End: 2025-06-13

## 2025-06-13 RX ORDER — LIDOCAINE HYDROCHLORIDE 10 MG/ML
8 INJECTION, SOLUTION INFILTRATION; PERINEURAL ONCE
Status: COMPLETED | OUTPATIENT
Start: 2025-06-13 | End: 2025-06-13

## 2025-06-13 RX ADMIN — METHYLPREDNISOLONE ACETATE 80 MG: 40 INJECTION, SUSPENSION INTRA-ARTICULAR; INTRALESIONAL; INTRAMUSCULAR; SOFT TISSUE at 13:23

## 2025-06-13 RX ADMIN — LIDOCAINE HYDROCHLORIDE 8 ML: 10 INJECTION, SOLUTION INFILTRATION; PERINEURAL at 13:22

## (undated) DEVICE — GOWN,SIRUS,POLYRNF,BRTHSLV,XL,30/CS: Brand: MEDLINE

## (undated) DEVICE — SUTURE MCRYL SZ 4-0 L27IN ABSRB UD L19MM PS-2 1/2 CIR PRIM Y426H

## (undated) DEVICE — TOTAL SHOULDER: Brand: MEDLINE INDUSTRIES, INC.

## (undated) DEVICE — 3M™ COBAN™ NL STERILE NON-LATEX SELF-ADHERENT WRAP, 2086S, 6 IN X 5 YD (15 CM X 4,5 M), 12 ROLLS/CASE: Brand: 3M™ COBAN™

## (undated) DEVICE — SOLUTION IV 1000ML 0.9% SOD CHL

## (undated) DEVICE — GLOVE ORANGE PI 8   MSG9080

## (undated) DEVICE — SST TWIST DRILL, STANDARD, 2.4MM DIA. X 127MM: Brand: MICROAIRE®

## (undated) DEVICE — SUTURE ETHBND EXCEL SZ 2 L30IN NONABSORBABLE GRN L40MM V-37 MX69G

## (undated) DEVICE — GOWN,REINFRCE,POLY,ECLIPSE,SLV,3XLG: Brand: MEDLINE

## (undated) DEVICE — UNDERGLOVE SURG SZ 8 BLU LTX FREE SYN POLYISOPRENE POLYMER

## (undated) DEVICE — SUTURE VCRL SZ 2-0 L18IN ABSRB UD CT-1 L36MM 1/2 CIR J839D

## (undated) DEVICE — TOWEL,STOP FLAG GOLD N-W: Brand: MEDLINE

## (undated) DEVICE — SUTURE VCRL SZ 0 L18IN ABSRB UD L36MM CT-1 1/2 CIR J840D

## (undated) DEVICE — 3M™ TEGADERM™ TRANSPARENT FILM DRESSING FRAME STYLE, 1629, 8 IN X 12 IN (20 CM X 30 CM), 10/CT 8CT/CASE: Brand: 3M™ TEGADERM™

## (undated) DEVICE — GLOVE ORTHO 8   MSG9480

## (undated) DEVICE — GOWN,SIRUS,POLYRNF,BRTHSLV,XLN/XXL,18/CS: Brand: MEDLINE

## (undated) DEVICE — SOLUTION IRRIG 3000ML 0.9% SOD CHL USP UROMATIC PLAS CONT

## (undated) DEVICE — SYSTEM SKIN CLSR 22CM DERMBND PRINEO

## (undated) DEVICE — SOLUTION IV IRRIG WATER 1000ML POUR BRL 2F7114

## (undated) DEVICE — DRAPE ADAPTABLE ARM POSITIONER

## (undated) DEVICE — PAD DRY FLOOR ABS 32X58IN GRN